# Patient Record
Sex: MALE | Race: WHITE | NOT HISPANIC OR LATINO | Employment: OTHER | URBAN - METROPOLITAN AREA
[De-identification: names, ages, dates, MRNs, and addresses within clinical notes are randomized per-mention and may not be internally consistent; named-entity substitution may affect disease eponyms.]

---

## 2020-04-22 ENCOUNTER — TELEMEDICINE (OUTPATIENT)
Dept: CARDIOLOGY CLINIC | Facility: CLINIC | Age: 70
End: 2020-04-22
Payer: MEDICARE

## 2020-04-22 DIAGNOSIS — R06.00 DYSPNEA, UNSPECIFIED TYPE: ICD-10-CM

## 2020-04-22 DIAGNOSIS — M79.2 NEURALGIA OF LEFT UPPER EXTREMITY: Primary | ICD-10-CM

## 2020-04-22 PROCEDURE — 99203 OFFICE O/P NEW LOW 30 MIN: CPT | Performed by: INTERNAL MEDICINE

## 2021-03-18 ENCOUNTER — TELEPHONE (OUTPATIENT)
Dept: NEUROLOGY | Facility: CLINIC | Age: 71
End: 2021-03-18

## 2021-03-18 NOTE — TELEPHONE ENCOUNTER
Best contact number for patient: [phone]    Referring provider and telephone number: Patient to call PCP to have order faxed    Primary Care Provider Name and if affiliated with [facility]: Dee Martinez    Reason for Appointment/Dx: Movement    Have you seen and followed up with a pediatric Neurologist for this disease in the past? No    Neurology Location patient would like to be seen: [facility] or  [place] [address]    Order received? No                                                Records Received?   No    Have you ever seen another Neurologist?      No    Insurance Information    Insurance Name: Medicare/Aetna    Notes: Mailed Paperwork and placed patient on waitlist     Appointment date: 09/08/21

## 2021-05-13 ENCOUNTER — CONSULT (OUTPATIENT)
Dept: NEUROLOGY | Facility: CLINIC | Age: 71
End: 2021-05-13
Payer: COMMERCIAL

## 2021-05-13 VITALS — WEIGHT: 195.6 LBS | DIASTOLIC BLOOD PRESSURE: 80 MMHG | HEART RATE: 52 BPM | SYSTOLIC BLOOD PRESSURE: 138 MMHG

## 2021-05-13 DIAGNOSIS — G20 PARKINSON DISEASE (HCC): Primary | ICD-10-CM

## 2021-05-13 DIAGNOSIS — F41.9 ANXIETY DISORDER, UNSPECIFIED TYPE: ICD-10-CM

## 2021-05-13 PROBLEM — G20.A1 PARKINSON DISEASE: Status: ACTIVE | Noted: 2021-05-13

## 2021-05-13 PROCEDURE — 99205 OFFICE O/P NEW HI 60 MIN: CPT | Performed by: PSYCHIATRY & NEUROLOGY

## 2021-05-13 RX ORDER — ESOMEPRAZOLE MAGNESIUM 40 MG/1
40 CAPSULE, DELAYED RELEASE ORAL DAILY
COMMUNITY
Start: 2021-04-26

## 2021-05-13 RX ORDER — ACETAMINOPHEN 325 MG/1
325 TABLET ORAL AS NEEDED
COMMUNITY

## 2021-05-13 RX ORDER — SERTRALINE HYDROCHLORIDE 25 MG/1
TABLET, FILM COATED ORAL
COMMUNITY
Start: 2021-04-18

## 2021-05-13 RX ORDER — CLONAZEPAM 0.5 MG/1
0.12 TABLET ORAL
COMMUNITY
Start: 2021-04-23

## 2021-05-13 NOTE — ASSESSMENT & PLAN NOTE
Mild intermittent right-sided rest tremors along with subtle right arm bradykinesia and decreased arm swing consistent with probable idiopathic Parkinson's disease  He is not on any medication therefore thus far has not proven responsiveness to dopamine supplementation  Given mild tremor and subtle symptoms on on exam it is reasonable to continue off medications at this point  He will contact us prior to next visit should he start to develop increase tremor or other symptoms and wished to start medication  Time spent discussing the diagnosis of Parkinson's disease, the pathology, and treatment  Various medication options were discussed  If he were to progress we will start with a trial of pramipexole at low-dose  Questions regarding supplements in the and their use in Parkinson's disease were answered  Questions regarding deep brain stimulation surgery were answered  We spoke about the importance of exercise in modifying the progression of Parkinson's disease  He was encouraged to continue exercising has well as he has been  We discussed diet and Parkinson's disease and the importance of keeping both socially, mentally, and physically active

## 2021-05-13 NOTE — ASSESSMENT & PLAN NOTE
Anxiety in the setting of Parkinson's disease  Much improved on Zoloft 50 mg daily  Questions regarding when to taper off Zoloft were answered  Given he has just started to note improvement in anxiety we will leave him on the Zoloft for now  In the future once stabilized we can consider slowly tapering off  He continues on clonazepam 0 125 mg every other day as he plans on tapering  This was initially started for anxiety but used also for sleep  He will take 1/4 tab night every other night for the next week and then discontinue

## 2021-05-13 NOTE — PROGRESS NOTES
Patient ID: Karime Campbell is a 72 y o  male  Assessment/Plan:    Anxiety disorder  Anxiety in the setting of Parkinson's disease  Much improved on Zoloft 50 mg daily  Questions regarding when to taper off Zoloft were answered  Given he has just started to note improvement in anxiety we will leave him on the Zoloft for now  In the future once stabilized we can consider slowly tapering off  He continues on clonazepam 0 125 mg every other day as he plans on tapering  This was initially started for anxiety but used also for sleep  He will take 1/4 tab night every other night for the next week and then discontinue  [facility]  Mild intermittent right-sided rest tremors along with subtle right arm bradykinesia and decreased arm swing consistent with probable idiopathic Parkinson's disease  He is not on any medication therefore thus far has not proven responsiveness to dopamine supplementation  Given mild tremor and subtle symptoms on on exam it is reasonable to continue off medications at this point  He will contact us prior to next visit should he start to develop increase tremor or other symptoms and wished to start medication  Time spent discussing the diagnosis of Parkinson's disease, the pathology, and treatment  Various medication options were discussed  If he were to progress we will start with a trial of pramipexole at low-dose  Questions regarding supplements in the and their use in Parkinson's disease were answered  Questions regarding deep brain stimulation surgery were answered  We spoke about the importance of exercise in modifying the progression of Parkinson's disease  He was encouraged to continue exercising has well as he has been  We discussed diet and Parkinson's disease and the importance of keeping both socially, mentally, and physically active         Diagnoses and all orders for this visit:    Parkinson disease ([facility] )    Anxiety disorder, unspecified type    Other orders  -     clonazePAM (KlonoPIN) 0 5 mg tablet; 0 125 mg Every other night  -     sertraline (ZOLOFT) 25 mg tablet; ONE TABLET ONCE A DAY FOR FOR 2 WEEKS, THEN 2 TABLETS ONCE A DAY  -     esomeprazole (NexIUM) 40 MG capsule; Take 40 mg by mouth daily  -     acetaminophen (Tylenol) 325 mg tablet; Take 325 mg by mouth as needed for mild pain       Spent 65 minutes on patient visit, counseling , review of outside MRI, labs and notes, and documentation  Subjective:    Mr Nimo Ambrosio is a 30-year-old man who presents for movement disorder evaluation for Parkinson's disease  He is here with his son Piper Guerrero  Symptoms began in January of 2021 with right arm and leg rest tremor  In retrospect he had developed increased difficulty to 6 with maintaining sleep back in August of 2020 with subsequent development of anxiety  Anxiety was treated with clonazepam 0 5 mg b i d  He was seen via [facility] by Neurology at [facility] Dr Keisha Andrea  There he was noted to have symptoms consistent with early Parkinson's disease  The potential treatment with carbidopa levodopa was discussed with the opted to remain off medications given subtle symptoms  He was started on Zoloft for anxiety as this was his main symptom that He saw a 2nd opinion with Dr Sarina Durán neurologist   He agreed with the diagnosis of Parkinson's disease and with holding off on the in the initiation of many medication  MRI of the brain was performed which showed mild white matter changes, with a small right and frontal and cerebellar lesion  Labs were unremarkable  He presents today for a 3rd opinion to establish care  Was driving tremor  Over the past 2 weeks his anxiety has started to improve  He began to lower his clonazepam slowly  He is currently taking clonazepam 0 5 1/4 tablet every other day  Tuesday was the 1st day he skipped a dose and he was able to sleep  Both sleep and anxiety have improved   Tremor is mild and intermittent on the right side  He joined [employer] steady which he attends 3 x weekly at [employer] in addition to lifting and walking 6-7 miles iron trial      Speech is unchanged  No difficulty chewing and swallowing  He has lost weight over the past year  No difficulty with dressing and hygiene acts  Handwriting is micrographic  He has no difficulty arising out of chair  Gait is improved with strategies use that [employer]  He has decreased arm swing which he is more aware if  No changes in cognition  He is able to perform household tasks without difficulty  He denies any hallucinations  There are no changes in olfaction  He has no symptoms of REM sleep behavior disorder or RLS  Objective:    Blood pressure 138/80, pulse (!) 52, weight 88 7 kg (195 lb 9 6 oz)  Physical Exam  Vitals signs reviewed  Eyes:      Extraocular Movements: Extraocular movements intact  Pupils: Pupils are equal, round, and reactive to light  Pulmonary:      Effort: Pulmonary effort is normal    Neurological:      Mental Status: He is alert  Deep Tendon Reflexes: Strength normal          Neurological Exam  Mental Status  Alert  Oriented to person, place, time and situation  Speech: hypophonia  Language is fluent with no aphasia  Attention and concentration are normal     Cranial Nerves  CN III, IV, VI: Extraocular movements intact bilaterally  Pupils equal round and reactive to light bilaterally  CN VII: Full and symmetric facial movement  CN VIII: Hearing is normal   CN IX, X: Palate elevates symmetrically  CN XI: Shoulder shrug strength is normal   CN XII: Tongue midline without atrophy or fasciculations  Motor   Increased muscle tone  Right Arm with activation  Strength is 5/5 throughout all four extremities  Sensory  Light touch is normal in upper and lower extremities  Coordination  Right: Finger-to-nose normal  Rapid alternating movement abnormality:  Left: Finger-to-nose abnormality: Mild   Rapid alternating movement normal   See MDS UPDRS III  Gait  Casual gait: Normal pull test  Able to rise from chair without using arms  Decreased left arm swing  Slight reduction in L stride        MDS UPDRS III                                 Time since last dose:    Speech  0   Facial Expression  1   Rigidity - Neck  2   Rigidity - Upper Extremity (Right)  1   Rigidity - Upper Extremity (Left)   0   Rigidity - Lower Extremity (Right)  1   Rigidity - Lower Extremity (Left)   0   Finger Taps (Right)   1   Finger Taps (Left)   0   Hand Movement (Right)  0   Hand Movement (Left)   0   Pronation/Supination (Right)  1   Pronation/Supination (Left)   0   Toe Tapping (Right) 1   Toe Tapping (Left) 0   Leg Agility (Right)  0   Leg Agility (Left)   0   Arising from Chair   0   Gait   1 decrease arm swing   Freezing of Gait 0   Postural Stability   0   Posture 1   Global spontaneity of movement 0   Postural Tremor (Right) 0   Postural Tremor (Left) 0   Kinetic Tremor (Right)  0   Kinetic Tremor (Left)  1   Rest tremor amplitude RUE 1   Rest tremor amplitude LUE 0   Rest tremor amplitude RLE 0   Reset tremor amplitude LLE 0   Lip/Jaw Tremor  0   Consistency of tremor 1   Motor Exam Total:            ROS:    Review of Systems   Constitutional: Positive for fatigue  Negative for appetite change and fever  HENT: Negative  Negative for hearing loss, tinnitus, trouble swallowing and voice change  Eyes: Negative  Negative for photophobia and pain  Respiratory: Negative  Negative for shortness of breath  Cardiovascular: Negative  Negative for palpitations  Gastrointestinal: Negative  Negative for nausea and vomiting  Endocrine: Negative  Negative for cold intolerance  Genitourinary: Negative  Negative for dysuria, frequency and urgency  Musculoskeletal: Negative for myalgias and neck pain  Getting strength back     Skin: Negative  Negative for rash  Allergic/Immunologic: Negative      Neurological: Positive for tremors (Right thumb and Big toe in right foot)  Negative for dizziness, seizures, syncope, facial asymmetry, speech difficulty, weakness, light-headedness, numbness and headaches  Hematological: Negative  Does not bruise/bleed easily  Psychiatric/Behavioral: Positive for sleep disturbance  Negative for confusion and hallucinations  All other systems reviewed and are negative  Review of system was personally reviewed

## 2021-08-25 ENCOUNTER — OFFICE VISIT (OUTPATIENT)
Dept: NEUROLOGY | Facility: CLINIC | Age: 71
End: 2021-08-25
Payer: COMMERCIAL

## 2021-08-25 VITALS
WEIGHT: 208.5 LBS | DIASTOLIC BLOOD PRESSURE: 65 MMHG | TEMPERATURE: 98.5 F | HEART RATE: 52 BPM | SYSTOLIC BLOOD PRESSURE: 128 MMHG

## 2021-08-25 DIAGNOSIS — G20 PARKINSON DISEASE (HCC): Primary | ICD-10-CM

## 2021-08-25 PROCEDURE — 99214 OFFICE O/P EST MOD 30 MIN: CPT | Performed by: PSYCHIATRY & NEUROLOGY

## 2021-08-25 NOTE — ASSESSMENT & PLAN NOTE
67-year-old man w h/o anxiety and parkinsons presents as a follow up for his parkinson's disease  His parkinson's symptoms are mostly stable at this time  He physically and socially active  He does have mild resting tremors in Rt hand/thumb, Rt leg mild rt cog wheel rigidity, Rt bradykinesia  His symptoms are still mild so we plan to not start any medication at this time  Since  He is doing well so he wanted to know if he should decrease his Zoloft and see how he does, its appropriate and pt would decrease Zoloft to half tab  Plan-  · Decrease Zoloft to 25 mg daily   Things that we know are helpful for thinking and memory   Exercise program: gradually increase your physical activity over time  Start small and be patient  Aerobic (cardio) activity is best but incorporate balance, strength and flexibility training as well  Try to get at least 30min 3 times per week   Diet: Mediterranean diet (colorful fruits and vegetables, olive oil, fish, whole grains, very little red meet is any), MIND diet, anti-inflammatory diet     Stay well hydrated: drink 6-8 glasses of water per day   What's good for your heart is good for your brain  Avoid high-salt foods   Sleep: aim for at least 7-8 hours per night   Avoid alcohol and medications like Benadryl (Tylenol PM) or other sedating drugs  Stress management/mindfulness practice:   Talk with our social workers about finding a cognitive behavioral therapists  Try a smart phone toya like [employer] or mindfulness for beginners   Try curable for pain    Take a course in Mindfulness Based Stress Reduction (MBSR)   David crowley  Read or listen to an audiobook about it:  Mindfulness for beginners   10% happier  The happiness advantage   Social engagement:   Stay in touch with family and friends   Plan a few specific activities for your social health every week   Join a local support group   Volunteer! www [email or web address] or call [phone] diet score:  1 point for each component      Green leafy vegetables: at least 6 per week  Other vegetable: at least 1 per day   Berries: at least 2 per week  Red meat: fewer than 4 per week   Fish: at least 1 per week   Poultry: at least 2 per week  Beans: at least 3 per week  Nuts: at least 5 per week  Fast or fried food: less than 1 per week  Olive oil   Butter less: less than 1 table-spoon per day   Cheese: less than 1 serving per week   Pastries/sweets: less than 5 servings per week  Alcohol: no more than 1 serving/ day

## 2021-08-25 NOTE — PROGRESS NOTES
Patient ID: Rolly Lo is a 72 y o  male  Assessment/Plan:    Parkinson disease [facility])  67-year-old man w h/o anxiety and parkinsons presents as a follow up for his parkinson's disease  His parkinson's symptoms are mostly stable at this time  He physically and socially active  He does have mild resting tremors in Rt hand/thumb, Rt leg mild rt cog wheel rigidity, Rt bradykinesia  His symptoms are still mild so we plan to not start any medication at this time  Since  He is doing well so he wanted to know if he should decrease his Zoloft and see how he does, its appropriate and pt would decrease Zoloft to half tab  Plan-  · Decrease Zoloft to 25 mg daily   Things that we know are helpful for thinking and memory   Exercise program: gradually increase your physical activity over time  Start small and be patient  Aerobic (cardio) activity is best but incorporate balance, strength and flexibility training as well  Try to get at least 30min 3 times per week   Diet: Mediterranean diet (colorful fruits and vegetables, olive oil, fish, whole grains, very little red meet is any), MIND diet, anti-inflammatory diet     Stay well hydrated: drink 6-8 glasses of water per day   What's good for your heart is good for your brain  Avoid high-salt foods   Sleep: aim for at least 7-8 hours per night   Avoid alcohol and medications like Benadryl (Tylenol PM) or other sedating drugs  Stress management/mindfulness practice:   Talk with our social workers about finding a cognitive behavioral therapists  Try a smart phone toya like [employer] or mindfulness for beginners   Try curable for pain    Take a course in Mindfulness Based Stress Reduction (MBSR)   David crowley  Read or listen to an audiobook about it:  Mindfulness for beginners   10% happier  The happiness advantage   Social engagement:   Stay in touch with family and friends   Plan a few specific activities for your social health every week Join a local support group   Volunteer! www [email or web address] or call [phone]    MIND diet score:  1 point for each component  Green leafy vegetables: at least 6 per week  Other vegetable: at least 1 per day   Berries: at least 2 per week  Red meat: fewer than 4 per week   Fish: at least 1 per week   Poultry: at least 2 per week  Beans: at least 3 per week  Nuts: at least 5 per week  Fast or fried food: less than 1 per week  Olive oil   Butter less: less than 1 table-spoon per day   Cheese: less than 1 serving per week   Pastries/sweets: less than 5 servings per week  Alcohol: no more than 1 serving/ day       Diagnoses and all orders for this visit:    Parkinson disease ([facility] )           Subjective:    44-year-old man w h/o anxiety and parkinsons presents as a follow up for his parkinson's disease      He is here with his wife- Bernarda Teixeira  Symptoms began in January of 2021 with right arm and leg rest tremor  In retrospect he had developed increased difficulty to 6 with maintaining sleep back in August of 2020 with subsequent development of anxiety  Anxiety was treated with clonazepam 0 5 mg b i d  Since his last visist, He thinks things are stable, he continues to have tremors but only his right thumb sometimes in rt big toe, it does stop at times, he stopped clonazepam    He walks 16-18 miles a week  He lifts weight  He goes for rock steady boxing three times a week, tries his best to remain active  Regarding motor symptoms:   Tremor: Yes, now only in Rt thumb and sometimes in Rt big toe  Slowness- Stable   Stiffness: Yes, flexibility has got better as he stretches now  Dystonia: No   Changes in facial expression: okay   Changes in voice: It is softer     Changes in writing: yes, smaller   Changes in gait: He did have shuffling, No   Falls: No   Freezing: No   Trouble with swallowing: No   Clumsiness/dexterity: No      Regarding non-motor symptoms:   Anosmia: No   Constipation: yes,  Urinary sx: wake up once in a night   Lightheadedness: No   Changes in Mood: No   Trouble with sleep: No difficulty falling sleep,      REM behavior Disorder: No   Memory trouble: No   Hallucinations: No      The following portions of the patient's history were reviewed and updated as appropriate: allergies, current medications, past family history, past medical history, past social history, past surgical history and problem list          Objective:    Blood pressure 128/65, pulse (!) 52, temperature 98 5 °F (36 9 °C), weight 94 6 kg (208 lb 8 oz)  Physical Exam  Vitals reviewed  Constitutional:       Appearance: Normal appearance  HENT:      Head: Normocephalic  Mouth/Throat:      Mouth: Mucous membranes are moist       Pharynx: Oropharynx is clear  Eyes:      Extraocular Movements: Extraocular movements intact  Pupils: Pupils are equal, round, and reactive to light  Cardiovascular:      Rate and Rhythm: Normal rate  Pulses: Normal pulses  Pulmonary:      Effort: Pulmonary effort is normal    Abdominal:      Palpations: Abdomen is soft  Musculoskeletal:         General: Normal range of motion  Cervical back: Normal range of motion  Skin:     General: Skin is warm  Capillary Refill: Capillary refill takes less than 2 seconds  Neurological:      Mental Status: He is alert  Psychiatric:         Mood and Affect: Mood normal          Neurological Examination:     Mental Status: The patient was awake, alert, attentive, oriented to person, place, and time  Recent and remote memory intact to conversation with no evidence of language dysfunction  Cranial Nerves:   I: smell Not tested   II: visual fields Full to confrontation  Pupils equal, round, reactive to light with normal accomodation  Fundus: benign fundus  III,IV,VI: extraocular muscles EOMI, no nystagmus   V: masseter and pterygoid strength full   Sensation in the V1 through V3 distributions intact to pinprick and light touch bilaterally  VII: Face is symmetric with no weakness noted  VIII: Audition intact to finger rub bilaterally  IX/X: Uvula midline  Soft palate elevation symmetric  XI: Trapezius and SCM strength 5/5 B/L  XII: Tongue midline with no atrophy or fasciculations with appropriate movement  Motor Examination:   No pronator drift  Bulk: Normal  No atrophy Tone: Mild cogwheel rigidity in Rt arm  Fasciculations: None  Mild resting tremors in Rt hand, tremors still there in Rt when he extends his arms  Mild tremor in Rt leg, mild bradykinesia in Rt > left    Subtle mask facies- decrease eye blinking  Deltoid Biceps Triceps WE   WF   FF IO     Right        5         5          5         5      5      5   5        Left           5        5          5          5      5     5   5                       IP        Quad   Ham     TA       Gastroc   Right      5            5          5         5                5  Left         5            5         5         5                5       Reflexes:     2+ everywhere   Clonus: None    Coordination: Patient able to perform normal finger-to-nose and heel to shin appropriately  Normal rapid alternating movements  Sensory: Normal sensation to light touch throughout     Gait:normal stance and posture, normal stride length and arm swing,  Romberg negative  ROS:    Review of Systems   Constitutional: Negative  Negative for appetite change and fever  HENT: Negative  Negative for hearing loss, tinnitus, trouble swallowing and voice change  Eyes: Negative  Negative for photophobia and pain  Respiratory: Negative  Negative for shortness of breath  Cardiovascular: Negative  Negative for palpitations  Gastrointestinal: Negative  Negative for nausea and vomiting  Endocrine: Negative  Negative for cold intolerance  Genitourinary: Negative  Negative for dysuria, frequency and urgency  Musculoskeletal: Negative for myalgias and neck pain     Skin: Negative  Negative for rash  Allergic/Immunologic: Negative  Neurological: Positive for tremors (Right hand and only when resting but when sleeping it does not shake at all  has gotten better since last visit)  Negative for dizziness, seizures, syncope, facial asymmetry, speech difficulty, weakness, light-headedness, numbness and headaches  Hematological: Negative  Does not bruise/bleed easily  Psychiatric/Behavioral: Negative  Negative for confusion, hallucinations and sleep disturbance  All other systems reviewed and are negative

## 2021-08-25 NOTE — PATIENT INSTRUCTIONS
· Decrease Zoloft to 25 mg daily   Things that we know are helpful for thinking and memory   Exercise program: gradually increase your physical activity over time  Start small and be patient  Aerobic (cardio) activity is best but incorporate balance, strength and flexibility training as well  Try to get at least 30min 3 times per week   Diet: Mediterranean diet (colorful fruits and vegetables, olive oil, fish, whole grains, very little red meet is any), MIND diet, anti-inflammatory diet  Stay well hydrated: drink 6-8 glasses of water per day   What's good for your heart is good for your brain  Avoid high-salt foods   Sleep: aim for at least 7-8 hours per night   Avoid alcohol and medications like Benadryl (Tylenol PM) or other sedating drugs  Stress management/mindfulness practice:   Talk with our social workers about finding a cognitive behavioral therapists  Try a smart phone toya like [employer] or mindfulness for beginners   Try curable for pain    Take a course in Mindfulness Based Stress Reduction (MBSR)   David crowley  Read or listen to an audiobook about it:  Mindfulness for beginners   10% happier  The happiness advantage   Social engagement:   Stay in touch with family and friends   Plan a few specific activities for your social health every week   Join a local support group   Volunteer! www [email or web address] or call [phone]    MIND diet score:  1 point for each component      Green leafy vegetables: at least 6 per week  Other vegetable: at least 1 per day   Berries: at least 2 per week  Red meat: fewer than 4 per week   Fish: at least 1 per week   Poultry: at least 2 per week  Beans: at least 3 per week  Nuts: at least 5 per week  Fast or fried food: less than 1 per week  Olive oil   Butter less: less than 1 table-spoon per day   Cheese: less than 1 serving per week   Pastries/sweets: less than 5 servings per week  Alcohol: no more than 1 serving/ day

## 2021-09-08 ENCOUNTER — TELEPHONE (OUTPATIENT)
Dept: NEUROLOGY | Facility: CLINIC | Age: 71
End: 2021-09-08

## 2021-09-08 NOTE — TELEPHONE ENCOUNTER
Pt called the office  He was last seen 8/25  He has decided he would like to start medication for his PD  He states his tremor in right hand seems more constant at rest      Please advise      Pt# [phone]

## 2021-09-09 NOTE — TELEPHONE ENCOUNTER
I believe we previously spoke about starting a small dose of a dopamine agonist  We could start pramipexole ER 0 375mg qhs  If not covered and needs pramipexole IR then would start with pramipexole 0 125mg 1 tab 3 times daily q 8 hours apart with plans to increase to 0 25mg 3 x daily after 7 days     Script sent  Potential side effects: lightheaedness, nausea, leg edema , hallucinations, impulsivity

## 2021-09-09 NOTE — TELEPHONE ENCOUNTER
Reviewed Dr Kaylin Franz response with patient  Informed him that the rx for the ER was sent to pharmacy however he may need to start the IR if insurance requires it  Advised patient to f/u with pharmacy and let us know if they told him it's not covered

## 2021-09-15 NOTE — TELEPHONE ENCOUNTER
I would give it 2 weeks to notice any difference and see if the tiredness / sluggishness subside  He is on low dose so if not effective but tolerable we could increase a bit more     Yes  yes

## 2021-09-15 NOTE — TELEPHONE ENCOUNTER
Patient stated he has a few questions for Dr Melany An:    1  How long should it take for him to notice the pramipexole working  He reports that he's been taking it for the past 3 nights and hasn't noticed any benefits  Denies any SE except feeling more tired and sluggish  2  Is he okay to get the flu vaccine while taking the pramipexole? 3  Is he okay to get the COVID booster shot while taking the pramipexole? (he's already had the 2 shots)  Please advise  Okay to leave vm

## 2021-10-04 ENCOUNTER — TELEPHONE (OUTPATIENT)
Dept: NEUROLOGY | Facility: CLINIC | Age: 71
End: 2021-10-04

## 2021-12-08 ENCOUNTER — OFFICE VISIT (OUTPATIENT)
Dept: NEUROLOGY | Facility: CLINIC | Age: 71
End: 2021-12-08
Payer: COMMERCIAL

## 2021-12-08 VITALS — SYSTOLIC BLOOD PRESSURE: 136 MMHG | WEIGHT: 216 LBS | DIASTOLIC BLOOD PRESSURE: 72 MMHG | HEART RATE: 67 BPM

## 2021-12-08 DIAGNOSIS — G20 PARKINSON DISEASE (HCC): Primary | ICD-10-CM

## 2021-12-08 PROCEDURE — 99214 OFFICE O/P EST MOD 30 MIN: CPT | Performed by: PSYCHIATRY & NEUROLOGY

## 2021-12-08 RX ORDER — PRAMIPEXOLE 1.5 MG/1
1 TABLET, EXTENDED RELEASE ORAL
Qty: 30 TABLET | Refills: 4 | Status: SHIPPED | OUTPATIENT
Start: 2021-12-08 | End: 2022-01-10 | Stop reason: DRUGHIGH

## 2022-01-10 ENCOUNTER — TELEPHONE (OUTPATIENT)
Dept: NEUROLOGY | Facility: CLINIC | Age: 72
End: 2022-01-10

## 2022-01-10 NOTE — TELEPHONE ENCOUNTER
HE is to reduced pramipexole ER back to 1 mg qhs and we will add Sinemet 25/100 1/2 tab 3 times daily to be taken about 5-6 hours apart on an empty stomach if possible, if not with a low protein snack Script will be sent to pharmacy    Routing comment

## 2022-01-10 NOTE — TELEPHONE ENCOUNTER
We will reduce pramipexole ER to 0 75mg qhs  Add Sinemet 25/100 1 /2 tab 3 times daily taken q 5-6 hours apart on empty stomach  If ineffective in controlling symptoms after 1-2 week increase to 1 tab 3 times daily, contact us if he develops side effects   Script sent to pharmacy

## 2022-01-10 NOTE — TELEPHONE ENCOUNTER
pt called and states and state that he is taking   pramiperole er 1 5mg since dec 8   states that he is starting to experience some side effects that are concerning him  slight lightheadedness at times, signifcant weight gain (25 lbs), mild constipation, dry mouth, always has a mild headache, mild chest pain, left side, almost like a gas pain and mild trouble breathing  chest pain and trouble breathing occured 3-4 times in last month  states that he only slept 2 hours last night, spasms when he is sitting,   tremor in r hand and thumb is still there, not getting any worse but not getting any better  tremor is always there when he is inactive  chest pain and trouble breathing lasted all night last night  No chest pain or trouble breathing now  States that while he was being diagnosed with PD he lost 25lb and he has now gained that weight back  This is what he means by saying he gained 25 lbs     He is agreeable to changing medications if that is what is recommended  Please advise  [phone] to leave detailed message

## 2022-03-31 ENCOUNTER — TELEPHONE (OUTPATIENT)
Dept: NEUROLOGY | Facility: CLINIC | Age: 72
End: 2022-03-31

## 2022-03-31 NOTE — TELEPHONE ENCOUNTER
Spoke with pt and he verbalizes clear understanding  Pt is agreeable to decreasing dose  Pt states that he recently filled his Mirapex 0 75 mg script and is wondering if he may cut pills in half for new dose  Dr Jose Valdovinos - Please advise

## 2022-03-31 NOTE — TELEPHONE ENCOUNTER
Pt calling to report that he takes pramipexole at HS but he is wondering what is it exactly doing for him  States that his over all health is good but he gained 25 lbs in last few months  He states that he has been experiencing excessive eating and he's noticed slight swelling in ankle at times, not too bad, he notices it when he takes his socks off  Pt also says he feels bloated  Pt asking if he should stay on Mirapex or can he get off of it, but doesn't want being off of it to effect overall health and sleep  Pt says he works out everyday, walks 4-8 miles, and is still gaining weight  Pt takes Sinemet 1 tab TID (7am, noon, and 5pm) and Mirapex ER 0 75 mg 1 tablet at HS  Pt states that he can wait until appt to discuss but is wondering if these symptoms are from the Mirapex  Best cb [phone], ok to leave detailed message  Dr Melissa Piña - Please advise

## 2022-03-31 NOTE — TELEPHONE ENCOUNTER
Yes Mirapex can cause edema, weight gain , and impulsivity in some where they may overeat  If this is occurring we can lower and adjust the Sinemet as we need to  Will send in a script for Mirapex ER 0 375mg qhs, if no improvement after 3-4 weeks he can discontinue   We have to consider side effect versus benefit  Mirapex may be extending the levodopa so he may start to notice his levodopa doses are not lasting as long or are not as effective

## 2022-03-31 NOTE — TELEPHONE ENCOUNTER
Pt made aware not to cut medication in half and states he will  script tomorrow and take first dose tomorrow night

## 2022-05-26 ENCOUNTER — OFFICE VISIT (OUTPATIENT)
Dept: NEUROLOGY | Facility: CLINIC | Age: 72
End: 2022-05-26
Payer: COMMERCIAL

## 2022-05-26 VITALS — SYSTOLIC BLOOD PRESSURE: 136 MMHG | HEART RATE: 49 BPM | DIASTOLIC BLOOD PRESSURE: 67 MMHG

## 2022-05-26 DIAGNOSIS — F41.9 ANXIETY DISORDER, UNSPECIFIED TYPE: ICD-10-CM

## 2022-05-26 DIAGNOSIS — G20 PARKINSON DISEASE (HCC): Primary | ICD-10-CM

## 2022-05-26 PROCEDURE — 99214 OFFICE O/P EST MOD 30 MIN: CPT | Performed by: PSYCHIATRY & NEUROLOGY

## 2022-05-26 NOTE — ASSESSMENT & PLAN NOTE
Tremor predominant PD with slow progression over the past year  Good response to medications with regards to bradykinesia and rigidity but tremor not controlled  Time spent discussing PD, its progression and treatment options  Increases in Sinemet may better control tremor but all other symptoms are well controlled so after discussion we opted not to do so  Will have him remain on his current medication regimen  Tremor does not interfere with function but rather is just more conscious of them   Future surgical options discussed  He is considering using THC/CBD in an attempt to control tremor which is reasonable  Already on OTC CBD for sleep  Questions regarding medical marijuana were answered  There are no large, multicenter, placebo controlled trials with this and PD  Small ancedotal studies and surveys report some benefit in tremor, dyskinesia and nonmotor symptoms such as sleep and anxiety  He was encouraged to continue to remain active  Questions regarding waking and diet were answered  We discussed the potential benefits of a Mediterranean or MIND diet

## 2022-05-26 NOTE — PROGRESS NOTES
Patient ID: Darryle Khat is a 80 y o  male    Assessment/Plan:    Parkinson disease ([facility] [address] )  Tremor predominant PD with slow progression over the past year  Good response to medications with regards to bradykinesia and rigidity but tremor not controlled  Time spent discussing PD, its progression and treatment options  Increases in Sinemet may better control tremor but all other symptoms are well controlled so after discussion we opted not to do so  Will have him remain on his current medication regimen  Tremor does not interfere with function but rather is just more conscious of them   Future surgical options discussed  He is considering using THC/CBD in an attempt to control tremor which is reasonable  Already on OTC CBD for sleep  Questions regarding medical marijuana were answered  There are no large, multicenter, placebo controlled trials with this and PD  Small ancedotal studies and surveys report some benefit in tremor, dyskinesia and nonmotor symptoms such as sleep and anxiety  He was encouraged to continue to remain active  Questions regarding waking and diet were answered  We discussed the potential benefits of a Mediterranean or MIND diet  Diagnoses and all orders for this visit:    Parkinson disease [facility])        Subjective:      Mr Adama Bell is a 66ear-old man who presents for follow up for Parkinson's disease  To review, symptoms began in January 2021 with right arm and leg rest tremor  In retrospect insomnia with subsequent anxiety presents since Aug 2020  Dx by Neurology at [facility] Dr Maryuri Sterling  Started on Zoloft for anxiety  Seen by me for 3rd neurological opinion 5/2021  Opted not to treat until 9/2021       Prior medication trials:  Pramipexole Er 0 75mg qhs- compulsive eating and weight gain, lightheadedness, mild HA    Current medications:  Sinemet 25/100 1 tab 3 times daily   pramipexole Er 0 375mg qhs  CBD calm 10mg gummies - helps with anxiety/ calming    Overall doing well on current regimen  He continues to have right hand rest tremor  This is bothersome  He has looked into [facility] for PD tremor  Speech is unchanged,  No difficulty chewing and swallowing  Sleeps 6-7 hours due to multiple awakenings  Cognition is unchanged  He remains very active attending PD Boxing 3 times weekly at [facility]  He walks and lifts weights  He has been staying Flexibility has improved  Physically feels better  He has gained weight but this has stabilized  He plans to see a nutritionist       Objective:    /67 (BP Location: Left arm, Patient Position: Sitting, Cuff Size: Standard)   Pulse (!) 49       Physical Exam  Eyes:      Extraocular Movements: Extraocular movements intact  Pupils: Pupils are equal, round, and reactive to light  Neurological:      Mental Status: He is alert  Deep Tendon Reflexes: Strength normal    Psychiatric:         Speech: Speech normal          Neurological Exam  Mental Status  Alert  Oriented to person, place, time and situation  Speech is normal  Language is fluent with no aphasia  Attention and concentration are normal     Cranial Nerves  CN III, IV, VI: Extraocular movements intact bilaterally  Pupils equal round and reactive to light bilaterally  CN V: Facial sensation is normal   CN VII: Full and symmetric facial movement  CN IX, X: Palate elevates symmetrically  CN XI: Shoulder shrug strength is normal   CN XII: Tongue midline without atrophy or fasciculations  Motor   Normal muscle tone  Strength is 5/5 throughout all four extremities  Sensory  Light touch is normal in upper and lower extremities  Coordination  Right: Finger-to-nose normal  Rapid alternating movement abnormality:Left: Finger-to-nose normal  Rapid alternating movement normal   See MDS UPDRS III    Mild decrement on right CLARA  Moderate RH rest tremor  Gait  Casual gait: Abnormal pull test  Retropulsion, recovered in 4 steps  Chon Elizabeth Able to rise from chair without using arms  MDS UPDRS III                                       Time since last dose:    Speech  0   Facial Expression  1   Rigidity - Neck  0   Rigidity - Upper Extremity (R)  0   Rigidity - Upper Extremity (L)   0   Rigidity - Lower Extremity (R)  0   Rigidity - Lower Extremity (L)   0   Finger Taps (R)   0   Finger Taps (L)   0   Hand Movement (R)  0   Hand Movement (L)   0   Pronation/Supination (R)  2   Pronation/Supination (L)   0   Toe Tapping (R) 1   Toe Tapping (L) 1   Leg Agility (R)  0   Leg Agility (L)   0   Arising from Chair   0   Gait   1 decr arm swing   Freezing of Gait 0   Postural Stability   1   Posture 2   Global spontaneity of movement 0   Postural Tremor (Ri 0   Postural Tremor (L) 0   Kinetic Tremor (R)  0   Kinetic Tremor (L)  0   Rest tremor amplitude RUE 0   Rest tremor amplitude LUE 2   Rest tremor amplitude RLE 0   Reset tremor amplitude LLE 0   Lip/Jaw Tremor  0   Consistency of tremor 2   Motor Exam Total:             Current Outpatient Medications on File Prior to Visit   Medication Sig Dispense Refill    acetaminophen (TYLENOL) 325 mg tablet Take 325 mg by mouth as needed for mild pain      carbidopa-levodopa (Sinemet)  mg per tablet 1/2 tab 3 times daily (q 5-6 hours apart from awakening on empty stomach)  If not improvement after 2 weeks can increase to 1 tab 3 times daily 90 tablet 5    Pramipexole Dihydrochloride ER 0 375 MG TB24 Take 1 tablet (0 375 mg total) by mouth in the morning 30 tablet 2    clonazePAM (KlonoPIN) 0 5 mg tablet 0 125 mg Every other night (Patient not taking: No sig reported)      esomeprazole (NexIUM) 40 MG capsule Take 40 mg by mouth daily (Patient not taking: No sig reported)      sertraline (ZOLOFT) 25 mg tablet ONE TABLET ONCE A DAY FOR FOR 2 WEEKS, THEN 2 TABLETS ONCE A DAY (Patient not taking: No sig reported)       No current facility-administered medications on file prior to visit           Erasmo Vieira MD Afia  Movement disorder physician  [address] [place] [address]

## 2022-06-09 DIAGNOSIS — G20 PARKINSON DISEASE: ICD-10-CM

## 2022-06-09 RX ORDER — PRAMIPEXOLE 0.38 MG/1
1 TABLET, EXTENDED RELEASE ORAL DAILY
Qty: 90 TABLET | Refills: 1 | Status: SHIPPED | OUTPATIENT
Start: 2022-06-09 | End: 2022-11-17 | Stop reason: SDUPTHER

## 2022-09-07 ENCOUNTER — TELEPHONE (OUTPATIENT)
Dept: NEUROLOGY | Facility: CLINIC | Age: 72
End: 2022-09-07

## 2022-09-07 NOTE — TELEPHONE ENCOUNTER
Pt called and left message,reported increase in tremors in R thumb that are bothersome  asking what else can be done  Called pt to get some more information, reported increased in R thumb tremors over the last week or so  Parkinson's disease: Freezing? No    Shuffling? No    Difficulty Walking? No    Recent Falls? No    Any extra movements? Yes - R thumb tremors     Any Hallucinations? No  What time of day are symptoms worse- no specific time,ussually when resting   Current medications:  Carbidopa-levodopa  1 tab po TID  Pramipexole ER 0 375 po at HS,    Ask how long after each dose do they feel that it "wears off"? - denies     Does patient have a DBS? No     Pt denies any new  medications,denies any recent illness, reported seasonal allergy and appears to be worse lately and start taking Claritin    Best CB #[phone],YK to leave a message    Please advise next OV- 11/17

## 2022-09-08 NOTE — TELEPHONE ENCOUNTER
Called pt back  And advised regarding below, he verbalized understanding and agreed with dr Estefani Hess recommendation

## 2022-09-08 NOTE — TELEPHONE ENCOUNTER
Could try increasing Sinemet to 1 5 3 times daily Alert-The patient is alert, awake and responds to voice. The patient is oriented to time, place, and person. The triage nurse is able to obtain subjective information.

## 2022-11-17 ENCOUNTER — OFFICE VISIT (OUTPATIENT)
Dept: NEUROLOGY | Facility: CLINIC | Age: 72
End: 2022-11-17

## 2022-11-17 VITALS — SYSTOLIC BLOOD PRESSURE: 138 MMHG | WEIGHT: 215 LBS | HEART RATE: 56 BPM | DIASTOLIC BLOOD PRESSURE: 72 MMHG

## 2022-11-17 DIAGNOSIS — G20 PARKINSON DISEASE (HCC): Primary | ICD-10-CM

## 2022-11-17 DIAGNOSIS — F41.9 ANXIETY DISORDER, UNSPECIFIED TYPE: ICD-10-CM

## 2022-11-17 RX ORDER — PRAMIPEXOLE 0.38 MG/1
1 TABLET, EXTENDED RELEASE ORAL DAILY
Qty: 90 TABLET | Refills: 2 | Status: SHIPPED | OUTPATIENT
Start: 2022-11-17 | End: 2022-11-17

## 2022-11-17 NOTE — PROGRESS NOTES
Review of Systems   Constitutional: Negative  Negative for appetite change and fever  HENT: Negative  Negative for hearing loss, tinnitus, trouble swallowing and voice change  Eyes: Negative  Negative for photophobia, pain and visual disturbance  Respiratory: Negative  Negative for shortness of breath  Cardiovascular: Negative  Negative for palpitations  Gastrointestinal: Negative  Negative for nausea and vomiting  Endocrine: Negative  Negative for cold intolerance  Genitourinary: Negative  Negative for dysuria, frequency and urgency  Musculoskeletal: Negative  Negative for gait problem, myalgias and neck pain  Skin: Negative  Negative for rash  Allergic/Immunologic: Negative  Neurological: Positive for tremors (Right thumb and has stayed the same but is only in his thumb of Right hand, no longer in arm or leg)  Negative for dizziness, seizures, syncope, facial asymmetry, speech difficulty, weakness, light-headedness, numbness and headaches  Hematological: Negative  Does not bruise/bleed easily  Psychiatric/Behavioral: Positive for sleep disturbance (last couple weeks has been having issues sleeping past 3:30 AM)  Negative for confusion and hallucinations  All other systems reviewed and are negative

## 2022-11-17 NOTE — ASSESSMENT & PLAN NOTE
Tremor predominant PD, H&Y stage I, with slow progression over the past year  Mild progression in bradykinesia her hands since last seen  He continues to have tremors which are not fully controlled on low-dose levodopa  No clear fluctuations  He is satisfied response medications  He did however have several questions with regards to prognosis, and future treatment options  Time spent discussing PD, its progression and treatment options  We discussed the potential benefits of increasing Sinemet to 1 tablet 4 times daily given longer days in his concern with regards to having medications overnight  Increased levodopa may help better control tremor  Would limit any increase in pramipexole given prior side effects weight gain  Questions with regards to other medications PD were discussed  We discussed potential options including addition of COMT inhibitors, amantadine, Nourianz, and rescue medications such as able morphine  He will try [facility] as planned to determine if this may help sleep and tremor  In the future we may considery increasing levodopa to 1 tab 4 time sdaily (q 5 hours apart)  He was encouraged to continue to remain active

## 2022-11-17 NOTE — PATIENT INSTRUCTIONS
Try medical marijuana as planned to determine if this may help sleep and tremor  Future option will be to try increasing levodopa to 1 tab 4 time sdaily (q 5 hours apart)

## 2022-11-17 NOTE — PROGRESS NOTES
Patient ID: Flori Barr is a 71 y o  male    Assessment/Plan:    Anxiety disorder  Mood fairly well controlled  Parkinson disease ([facility] )  Tremor predominant PD, H&Y stage I, with slow progression over the past year  Mild progression in bradykinesia her hands since last seen  He continues to have tremors which are not fully controlled on low-dose levodopa  No clear fluctuations  He is satisfied response medications  He did however have several questions with regards to prognosis, and future treatment options  Time spent discussing PD, its progression and treatment options  We discussed the potential benefits of increasing Sinemet to 1 tablet 4 times daily given longer days in his concern with regards to having medications overnight  Increased levodopa may help better control tremor  Would limit any increase in pramipexole given prior side effects weight gain  Questions with regards to other medications PD were discussed  We discussed potential options including addition of COMT inhibitors, amantadine, Nourianz, and rescue medications such as able morphine  He will try [facility] as planned to determine if this may help sleep and tremor  In the future we may considery increasing levodopa to 1 tab 4 time sdaily (q 5 hours apart)  He was encouraged to continue to remain active  Diagnoses and all orders for this visit:    Parkinson disease ([facility] )  -     carbidopa-levodopa (SINEMET)  mg per tablet; take 1 TAB 3 TIMES DAILY  -     Discontinue: Pramipexole Dihydrochloride ER 0 375 MG TB24; Take 1 tablet (0 375 mg total) by mouth in the morning Night time    Anxiety disorder, unspecified type    Spent over 40 minutes on patient visit, counseling, and documentation  Subjective:      Mr Braxton Castleman is a 66ear-old man who presents for follow up for Parkinson's disease  To review, symptoms began in January 2021 with right arm and leg rest tremor   In retrospect insomnia with subsequent anxiety presents since Aug 2020  Dx by Neurology at [facility] Dr Wendy Romero  Started on Zoloft for anxiety  Seen by me for 3rd neurological opinion 5/2021  Opted not to treat until 9/2021  Prior medication trials:  Pramipexole Er 0 75mg qhs- compulsive eating and weight gain, lightheadedness, mild HA    Current medications:  Sinemet 25/100 1 tab 3 times daily   pramipexole Er 0 375mg q9:30pm  CBD calm 10mg gummies - helps with anxiety/ calming    He has noted a tightnessness in the face upon first arising lasting about 5-10 minutes  He has difficulty sleeping past 3:30am  He tends to fall asleep on the cough and watches to tv fall asleep  No worsening of tremor  He is interested in trying [facility] to see if this may help tremor in sleep  Medications are lasting all day without wearing off  He denies any changes in cognition  He remains very active attending PD Boxing 3 times weekly at [facility]  He walks and lifts weights  Objective:    /72 (BP Location: Left arm, Patient Position: Standing, Cuff Size: Standard)   Pulse 56   Wt 97 5 kg (215 lb)       Physical Exam  Vitals reviewed  Eyes:      Extraocular Movements: Extraocular movements intact  Pupils: Pupils are equal, round, and reactive to light  Neurological:      Mental Status: He is alert  Motor: Motor strength is normal          Neurological Exam  Mental Status  Alert  Oriented to person, place, time and situation  Speech: hypophonia  Language is fluent with no aphasia  Attention and concentration are normal     Cranial Nerves  CN III, IV, VI: Extraocular movements intact bilaterally  Pupils equal round and reactive to light bilaterally  CN VII: Full and symmetric facial movement  CN VIII: Hearing is normal   CN IX, X: Palate elevates symmetrically  CN XI: Shoulder shrug strength is normal   CN XII: Tongue midline without atrophy or fasciculations  Motor   Normal muscle tone   Strength is 5/5 throughout all four extremities  Sensory  Light touch is normal in upper and lower extremities  Coordination  Right: Finger-to-nose normal  Rapid alternating movement abnormality:Left: Finger-to-nose normal  Rapid alternating movement normal   See motor UPDRS    Intermittent moderate right hand rest tremor  Mild decrement on finger taps and rapid alternating movements on the right       Gait  Casual gait: Able to rise from chair without using arms    Mild reduction in right arm swing, rest tremor        MDS UPDRS III                                     11/17/22   Time since last dose:      Speech  0 0   Facial Expression  1 1   Rigidity - Neck  0 0   Rigidity - Upper Extremity (R)  0 0   Rigidity - Upper Extremity (L)   0 0   Rigidity - Lower Extremity (R)  0 0   Rigidity - Lower Extremity (L)   0 0   Finger Taps (R)   0 1>   Finger Taps (L)   0 1   Hand Movement (R)  0 1   Hand Movement (L)   0 0   Pronation/Supination (R)  2 2   Pronation/Supination (L)   0 0   Toe Tapping (R) 1 1   Toe Tapping (L) 1 1   Leg Agility (R)  0 0   Leg Agility (L)   0 0   Arising from Chair   0 0   Gait   1 decr arm swing 1   Freezing of Gait 0 0   Postural Stability   1    Posture 2 1   Global spontaneity of movement 0 0   Postural Tremor (Ri 0 0   Postural Tremor (L) 0 0   Kinetic Tremor (R)  0 0   Kinetic Tremor (L)  0 0   Rest tremor amplitude RUE 0 2   Rest tremor amplitude LUE 2 0   Rest tremor amplitude RLE 0 0   Reset tremor amplitude LLE 0 0   Lip/Jaw Tremor  0 0   Consistency of tremor 2 2   Motor Exam Total:             Current Outpatient Medications on File Prior to Visit   Medication Sig Dispense Refill   • acetaminophen (TYLENOL) 325 mg tablet Take 325 mg by mouth as needed for mild pain     • esomeprazole (NexIUM) 40 MG capsule Take 40 mg by mouth daily     • [DISCONTINUED] carbidopa-levodopa (SINEMET)  mg per tablet take 1 TAB 3 TIMES DAILY 270 tablet 1   • [DISCONTINUED] Pramipexole Dihydrochloride ER 0 375 MG TB24 Take 1 tablet (0 375 mg total) by mouth in the morning (Patient taking differently: Take 1 tablet by mouth in the morning Night time) 90 tablet 1   • [DISCONTINUED] clonazePAM (KlonoPIN) 0 5 mg tablet 0 125 mg Every other night (Patient not taking: Reported on 8/25/2021)     • [DISCONTINUED] sertraline (ZOLOFT) 25 mg tablet ONE TABLET ONCE A DAY FOR FOR 2 WEEKS, THEN 2 TABLETS ONCE A DAY (Patient not taking: No sig reported)       No current facility-administered medications on file prior to visit           Phillip Kessler MD  Movement disorder physician  [address] [place] [address]

## 2023-05-04 ENCOUNTER — OFFICE VISIT (OUTPATIENT)
Dept: NEUROLOGY | Facility: CLINIC | Age: 73
End: 2023-05-04

## 2023-05-04 VITALS — SYSTOLIC BLOOD PRESSURE: 140 MMHG | DIASTOLIC BLOOD PRESSURE: 78 MMHG | WEIGHT: 220 LBS | HEART RATE: 56 BPM

## 2023-05-04 DIAGNOSIS — G20 PARKINSON DISEASE (HCC): Primary | ICD-10-CM

## 2023-05-04 NOTE — PROGRESS NOTES
Review of Systems   Constitutional: Negative  Negative for appetite change and fever  HENT: Negative  Negative for hearing loss, tinnitus, trouble swallowing and voice change  Eyes: Negative  Negative for photophobia, pain and visual disturbance  Respiratory: Negative  Negative for shortness of breath  Cardiovascular: Negative  Negative for palpitations  Gastrointestinal: Negative  Negative for nausea and vomiting  Endocrine: Negative  Negative for cold intolerance  Genitourinary: Negative  Negative for dysuria, frequency and urgency  Musculoskeletal: Negative  Negative for gait problem, myalgias and neck pain  Skin: Negative  Negative for rash  Allergic/Immunologic: Negative  Neurological: Positive for tremors (Thumb of Right hand, has gotten better)  Negative for dizziness, seizures, syncope, facial asymmetry, speech difficulty, weakness, light-headedness, numbness and headaches  Hematological: Negative  Does not bruise/bleed easily  Psychiatric/Behavioral: Negative  Negative for confusion, hallucinations and sleep disturbance  All other systems reviewed and are negative

## 2023-05-04 NOTE — PROGRESS NOTES
Patient ID: Kayla Philip is a 71 y o  male    Assessment/Plan:    Parkinson disease ([facility] )  Tremor predominant PD, H&Y stage I, with slow progression tremors which are not fully controlled on low-dose levodopa along with pramipexole ER  No clear fluctuations  Tremors have become more frequent and he is interested in any medication changes or options to better control tremor as this is now bothersome  Time spent discussing treatment options  We could further increase levodopa to see if tremor respond to higher doses  Given there is no wearing off, and instead he is having more difficulty with tremor itself, will increase to 1 5 tablets 3 times daily  If no improvement in 2 weeks he can further increase to 2 tablets 3 times daily  Would limit any increase in pramipexole given prior side effects weight gain  Questions with regards to MRI focused ultrasound in the treatment of Parkinson disease tremor were answered  CBD/THC in chair has helped with sleep  Encouraged to continue with daily exercise and involvement with PD boxing program         Diagnoses and all orders for this visit:    Parkinson disease ([facility] )    Other orders  -     NON FORMULARY; CBD / THC Tincture (dream) at night        Subjective:      Mr Terra Muse is a 71year-old man who presents for follow up for Parkinson's disease  To review, symptoms began in January 2021 with right arm and leg rest tremor  In retrospect insomnia with subsequent anxiety presents since Aug 2020  Dx by Neurology at [facility] Dr Charlie Mendoza  Started on Zoloft for anxiety  Seen by me for 3rd neurological opinion 5/2021  Opted not to treat until 9/2021  He continues to have tremors which are not fully controlled on low-dose levodopa  He continue to have a right hand rest tremor which is bothersome at times  Speech is unchanged  There is no drooling  No difficulty chewing and swallowing  Dressing and hygiene acts performed independently  Handwriting is unchanged  There is no difficulty arising out of chair  Gait is unchanged  Sleeps well with CBD tincture  There is no daytime sedation  No RBD symptoms  Cognition is unchanged  He reads, does crosswords and play games  No hallucinations, leg edema, lightheadedness, impulsivity, or daytime sedation  No difficulty driving  He exercises daily  He remains very active attending PD Boxing 3 times weekly at [facility]  He walks and lifts weights  Prior medication trials:  Pramipexole Er 0 75mg qhs- compulsive eating and weight gain, lightheadedness, mild HA    Current medications:  Sinemet 25/100 1 tab 3 times daily (6-7, 12pm, 6-7)  pramipexole Er 0 375mg q 9:30pm- weight gain  CBD dream tincture  - helps with sleep          Objective:    /78 (BP Location: Left arm, Patient Position: Standing, Cuff Size: Large)   Pulse 56   Wt 99 8 kg (220 lb)       Physical Exam  Vitals reviewed  Eyes:      Extraocular Movements: Extraocular movements intact  Pupils: Pupils are equal, round, and reactive to light  Neurological:      Mental Status: He is alert  Motor: Motor strength is normal          Neurological Exam  Mental Status  Alert  Oriented to person, place, time and situation  Speech: hypophonia  Language is fluent with no aphasia  Attention and concentration are normal     Cranial Nerves  CN III, IV, VI: Extraocular movements intact bilaterally  Pupils equal round and reactive to light bilaterally  CN VII: Full and symmetric facial movement  CN VIII: Hearing is normal   CN IX, X: Palate elevates symmetrically  CN XI: Shoulder shrug strength is normal   CN XII: Tongue midline without atrophy or fasciculations  Motor   Normal muscle tone  Strength is 5/5 throughout all four extremities  Sensory  Light touch is normal in upper and lower extremities       Coordination  Right: Finger-to-nose normal  Rapid alternating movement abnormality:Left: Finger-to-nose normal  Rapid alternating movement abnormality:  See motor UPDRS  Gait  Casual gait is normal including stance, stride, and arm swing  Retropulsed , recovered in 4 steps    Able to rise from chair without using arms  Normal stride  Reduced right arm swing with rest tremor        MDS UPDRS III                              5/4/23   Time since last dose:    Speech  0   Facial Expression  1   Rigidity - Neck  0   Rigidity - Upper Extremity (R)  0   Rigidity - Upper Extremity (L)   0   Rigidity - Lower Extremity (R)  0   Rigidity - Lower Extremity (L)   0   Finger Taps (R)   0   Finger Taps (L)   0   Hand Movement (R)  1   Hand Movement (L)   0   Pronation/Supination (R)  2   Pronation/Supination (L)   0   Toe Tapping (R) 1   Toe Tapping (L) 1   Leg Agility (R)  0   Leg Agility (L)   0   Arising from Chair   0   Gait   1   Freezing of Gait 0   Postural Stability   1   Posture 1   Global spontaneity of movement 0   Postural Tremor (Ri 0   Postural Tremor (L) 0   Kinetic Tremor (R)  0   Kinetic Tremor (L)  0   Rest tremor amplitude RUE 2   Rest tremor amplitude LUE 0   Rest tremor amplitude RLE 0   Reset tremor amplitude LLE 0   Lip/Jaw Tremor  0   Consistency of tremor 3   Motor Exam Total:             Current Outpatient Medications on File Prior to Visit   Medication Sig Dispense Refill    acetaminophen (TYLENOL) 325 mg tablet Take 325 mg by mouth as needed for mild pain      NON FORMULARY CBD / THC Tincture (dream) at night      Pramipexole Dihydrochloride ER 0 375 MG TB24 Take 1 tablet (0 375 mg total) by mouth daily at bedtime Night time 90 tablet 2    [DISCONTINUED] carbidopa-levodopa (SINEMET)  mg per tablet take 1 TAB 3 TIMES DAILY 270 tablet 1    carbidopa-levodopa (SINEMET)  mg per tablet TAKE 1 5 TABLET BY MOUTH THREE TIMES A  tablet 2    esomeprazole (NexIUM) 40 MG capsule Take 40 mg by mouth daily (Patient not taking: Reported on 5/4/2023)      [DISCONTINUED] clonazePAM (KlonoPIN) 0 5 mg tablet 0 125 mg Every other night (Patient not taking: Reported on 8/25/2021)      [DISCONTINUED] sertraline (ZOLOFT) 25 mg tablet ONE TABLET ONCE A DAY FOR FOR 2 WEEKS, THEN 2 TABLETS ONCE A DAY (Patient not taking: No sig reported)       No current facility-administered medications on file prior to visit           Cony Moses MD  Movement disorder physician  [address] [place] [address]

## 2023-05-04 NOTE — ASSESSMENT & PLAN NOTE
Tremor predominant PD, H&Y stage I, with slow progression tremors which are not fully controlled on low-dose levodopa along with pramipexole ER  No clear fluctuations  Tremors have become more frequent and he is interested in any medication changes or options to better control tremor as this is now bothersome  Time spent discussing treatment options  We could further increase levodopa to see if tremor respond to higher doses  Given there is no wearing off, and instead he is having more difficulty with tremor itself, will increase to 1 5 tablets 3 times daily  If no improvement in 2 weeks he can further increase to 2 tablets 3 times daily  Would limit any increase in pramipexole given prior side effects weight gain  Questions with regards to MRI focused ultrasound in the treatment of Parkinson disease tremor were answered  CBD/THC in chair has helped with sleep        Encouraged to continue with daily exercise and involvement with PD boxing program

## 2023-05-04 NOTE — PATIENT INSTRUCTIONS
Given bothersome tremor will increase carbidopa/levodopa 25/100 to 1 5 tabs 3 times daily  If no improvement after 2 weeks can further increase to 2 tabs 3 times daily  If you develop side effects contact the office

## 2023-07-28 DIAGNOSIS — G20 PARKINSON DISEASE (HCC): ICD-10-CM

## 2023-07-30 RX ORDER — PRAMIPEXOLE 0.38 MG/1
1 TABLET, EXTENDED RELEASE ORAL
Qty: 90 TABLET | Refills: 2 | Status: SHIPPED | OUTPATIENT
Start: 2023-07-30

## 2023-10-05 ENCOUNTER — OFFICE VISIT (OUTPATIENT)
Dept: NEUROLOGY | Facility: CLINIC | Age: 73
End: 2023-10-05
Payer: COMMERCIAL

## 2023-10-05 VITALS — DIASTOLIC BLOOD PRESSURE: 74 MMHG | WEIGHT: 221.8 LBS | SYSTOLIC BLOOD PRESSURE: 154 MMHG | HEART RATE: 55 BPM

## 2023-10-05 DIAGNOSIS — G20.A1 PARKINSON DISEASE: Primary | ICD-10-CM

## 2023-10-05 DIAGNOSIS — K59.01 SLOW TRANSIT CONSTIPATION: ICD-10-CM

## 2023-10-05 PROCEDURE — 99215 OFFICE O/P EST HI 40 MIN: CPT | Performed by: PSYCHIATRY & NEUROLOGY

## 2023-10-05 RX ORDER — CELECOXIB 200 MG/1
200 CAPSULE ORAL AS NEEDED
COMMUNITY

## 2023-10-05 NOTE — PROGRESS NOTES
Review of Systems   Constitutional: Negative for appetite change, fatigue and fever. HENT: Negative. Negative for hearing loss, tinnitus, trouble swallowing and voice change. Eyes: Negative. Negative for photophobia, pain and visual disturbance. Respiratory: Negative. Negative for shortness of breath. Cardiovascular: Negative. Negative for palpitations. Gastrointestinal: Positive for nausea. Negative for vomiting. Endocrine: Negative. Negative for cold intolerance. Genitourinary: Negative. Negative for dysuria, frequency and urgency. Constipation   Musculoskeletal: Negative for back pain, gait problem, myalgias and neck pain. Skin: Negative. Negative for rash. Allergic/Immunologic: Negative. Neurological: Negative. Negative for dizziness, tremors, seizures, syncope, facial asymmetry, speech difficulty, weakness, light-headedness, numbness and headaches. Hematological: Negative. Does not bruise/bleed easily. Psychiatric/Behavioral: Negative. Negative for confusion, hallucinations and sleep disturbance. All other systems reviewed and are negative.

## 2023-10-05 NOTE — PROGRESS NOTES
Patient ID: Robby Allan is a 68 y.o. male    Assessment/Plan:    Slow transit constipation  Worsening constipation. He reports a history of constipation for which he is taking MiraLAX daily for years. This has always been effective and now is not as effective with him having difficulty going over a few days. He was encouraged to increase fluid intake. We discussed dietary changes that could be made. He could try taking Metamucil and a way to increase fiber. He reports being due for colonoscopy. I recommended perhaps having a consultation with the GI physician prior to discuss worsening constipation for any further work-up or suggestions. Parkinson disease ([address])  Tremor predominant PD since 2021, H&Y stage I, with tremors which are not fully controlled on levodopa along with pramipexole. Tremor continues to be his most bothersome symptom  Along with constipation. After much discussion it appears that perhaps his medications are starting to wear off closer to 4 hours. Therefore instructed to take  Sinemet 25/100 1.5 tabs  at 7am , 11am, 3pm and 7pm  Pramipexole ER 0.375mg q 9:30pm     Time spent discussing other treatment options which may better control tremor. We could try adding medication such as amantadine or trihexyphenidyl. I am hesitant to do so at this time given he is currently having significant difficulty with constipation which may be a potential side effect. Therefore levodopa increase was made. We also spoke about neurosurgical options and their use in Parkinson's disease. He is very early into his disease with very little difficulty other than intractable tremor. We discussed the role of MRI focused ultrasound versus DBS in treating patients with Parkinson's disease. At this point I believe the best course of action would be increasing levodopa and trying to address worsening constipation.       Encouraged to continue with physical activity and to continue to repeat the boxing course. Encouraged to increase liquids. Diagnoses and all orders for this visit:    Parkinson disease    Slow transit constipation    Other orders  -     celecoxib (CeleBREX) 200 mg capsule; Take 200 mg by mouth if needed for mild pain    I have spent a total time of 45 minutes on 10/06/23 in caring for this patient including Prognosis, Risks and benefits of tx options, Patient and family education, Impressions, Counseling / Coordination of care and Documenting in the medical record. Subjective:      Mr. Maxine Plascencia is a 68year-old man who presents for follow up for Parkinson's disease. To review, symptoms began in January 2021 with right arm and leg rest tremor. In retrospect insomnia with subsequent anxiety presents since Aug 2020. Dx by Neurology at [facility] Dr. Chris Connelly. Started on Zoloft for anxiety. Seen by me for 3rd neurological opinion 5/2021. Opted not to treat until 9/2021. He continues to have tremors which are not fully controlled on low-dose levodopa. He presents today with nausea and constipation. Increasing constipation developed in Jul;y and has persisted. He has taken daily Miralax for years but no longer effective. He is working on increasing liquids as he knows this is an issue. Drinks about 36 oz total.     Right hand rest tremor present much of the day. Speech is unchanged. No difficulty chewing and swallowing. Dressing and hygiene acts performed independently. Ambulates well without any difficulty. Sleeps well. No RBD symptoms. Cognition is unchanged. No hallucinations, leg edema, lightheadedness, impulsivity, or daytime sedation. No difficulty driving. Attends PD Boxing 3 times weekly at [facility]. He walks and lifts weights.        Prior medication trials:  Pramipexole Er 0.75mg qhs- compulsive eating and weight gain, lightheadedness, mild HA    Current medications:  Sinemet 25/100 1.5 tab 3 times daily (6-7, 12pm, 6-7)  pramipexole Er 0.375mg q 9:30pm- weight gain  CBD dream tincture  - helps with sleep          Objective:    /74 (BP Location: Left arm, Patient Position: Sitting, Cuff Size: Adult)   Pulse 55   Wt 101 kg (221 lb 12.8 oz)       Physical Exam  Vitals reviewed. Eyes:      Extraocular Movements: Extraocular movements intact. Pupils: Pupils are equal, round, and reactive to light. Neurological:      Mental Status: He is alert. Motor: Motor strength is normal.        Neurological Exam  Mental Status  Alert. Oriented to person, place, time and situation. Speech: hypophonia. Language is fluent with no aphasia. Attention and concentration are normal.    Cranial Nerves  CN III, IV, VI: Extraocular movements intact bilaterally. Pupils equal round and reactive to light bilaterally. CN VII: Full and symmetric facial movement. CN VIII: Hearing is normal.  CN IX, X: Palate elevates symmetrically  CN XI: Shoulder shrug strength is normal.  CN XII: Tongue midline without atrophy or fasciculations. Motor   Normal muscle tone. Strength is 5/5 throughout all four extremities. Sensory  Light touch is normal in upper and lower extremities. Coordination  Right: Finger-to-nose normal. Rapid alternating movement abnormality:Left: Finger-to-nose normal. Rapid alternating movement abnormality:  See motor UPDRS. Gait   Able to rise from chair without using arms. Good stride. Reduced arm swing with tremor. .      MDS UPDRS III                              5/4/23 10/5/23   Time since last dose:      Speech  0 0   Facial Expression  1 1   Rigidity - Neck  0 0   Rigidity - Upper Extremity (R)  0 1   Rigidity - Upper Extremity (L)   0 0   Rigidity - Lower Extremity (R)  0 1   Rigidity - Lower Extremity (L)   0 0   Finger Taps (R)   0 1   Finger Taps (L)   0 0   Hand Movement (R)  1 1   Hand Movement (L)   0 0   Pronation/Supination (R)  2 2   Pronation/Supination (L)   0 0   Toe Tapping (R) 1 1   Toe Tapping (L) 1 1   Leg Agility (R)  0 0   Leg Agility (L)   0 0   Arising from Chair   0 0   Gait   1 1   Freezing of Gait 0 0   Postural Stability   1 0   Posture 1 1   Global spontaneity of movement 0 0   Postural Tremor (Ri 0 0   Postural Tremor (L) 0 0   Kinetic Tremor (R)  0 0   Kinetic Tremor (L)  0 0   Rest tremor amplitude RUE 2 2   Rest tremor amplitude LUE 0 0   Rest tremor amplitude RLE 0 0   Reset tremor amplitude LLE 0 0   Lip/Jaw Tremor  0 0   Consistency of tremor 3 3   Motor Exam Total:                Lydia Ruelas MD  Movement disorder physician  [address] [facility]

## 2023-10-05 NOTE — ASSESSMENT & PLAN NOTE
Worsening constipation. He reports a history of constipation for which he is taking MiraLAX daily for years. This has always been effective and now is not as effective with him having difficulty going over a few days. He was encouraged to increase fluid intake. We discussed dietary changes that could be made. He could try taking Metamucil and a way to increase fiber. He reports being due for colonoscopy. I recommended perhaps having a consultation with the GI physician prior to discuss worsening constipation for any further work-up or suggestions.

## 2023-10-05 NOTE — PATIENT INSTRUCTIONS
Sinemet 25/100 1.5 tabs  at 7am , 11am, 3pm and 7pm  Pramipexole ER 0.375mg q 9:30pm     Contact PCP regarding referral to GI

## 2023-10-06 NOTE — ASSESSMENT & PLAN NOTE
Tremor predominant PD since 2021, H&Y stage I, with tremors which are not fully controlled on levodopa along with pramipexole. Tremor continues to be his most bothersome symptom  Along with constipation. After much discussion it appears that perhaps his medications are starting to wear off closer to 4 hours. Therefore instructed to take  Sinemet 25/100 1.5 tabs  at 7am , 11am, 3pm and 7pm  Pramipexole ER 0.375mg q 9:30pm     Time spent discussing other treatment options which may better control tremor. We could try adding medication such as amantadine or trihexyphenidyl. I am hesitant to do so at this time given he is currently having significant difficulty with constipation which may be a potential side effect. Therefore levodopa increase was made. We also spoke about neurosurgical options and their use in Parkinson's disease. He is very early into his disease with very little difficulty other than intractable tremor. We discussed the role of MRI focused ultrasound versus DBS in treating patients with Parkinson's disease. At this point I believe the best course of action would be increasing levodopa and trying to address worsening constipation. Encouraged to continue with physical activity and to continue to repeat the boxing course. Encouraged to increase liquids.

## 2023-12-12 ENCOUNTER — TELEPHONE (OUTPATIENT)
Dept: NEUROLOGY | Facility: CLINIC | Age: 73
End: 2023-12-12

## 2023-12-12 NOTE — TELEPHONE ENCOUNTER
12/11 at 3:06 pm:    Good afternoon. My name is Mary Pritchett. I am a patient of Dr. Fernanda Ferrera and I have a question. I've been very, very constipated lately. I am having a colonoscopy on January 5th and I've been nauseous but very, very constipated. And I'm wondering if Dr. Fernanda Ferrera or someone could get back to me. Does she have any suggestions or recommendations? I am currently on a stool softener and a mild laxative which my GI specialist told me to take, but it doesn't seem to be working as well as it should be. And I'm very constipated.  ______________________________________________________________________________________________________    Ulisses Dwyer pt. No answer. Left a message on voice mail requesting a return call.

## 2023-12-12 NOTE — TELEPHONE ENCOUNTER
Spoke with pt to get more information. Pt had OV with GI (Dr. Weston Fitzpatrick) on 12/5/23. Is scheduled for colonoscopy on 1/5/24. Pt has not had a bowel movement since Saturday. He is taking a [facility] stool softener daily, and Dulcolax. He took Dulcolax yesterday and today. Pt also takes Miralax daily mixed in Gatorade. Also drank prune juice today. Pt reports having constipation issues all his life, but it has worsened since about June, when he changed his dosing of carbidopa-levodopa to 1.5 tabs TID. Pt said that he did try increasing the carbidopa-levodopa to QID, but it really did not help his symptoms, and he was having issues sleeping. He feels he is doing okay on his current dose of 1.5 tabs TID. His main issue is a tremor in his thumb and worsening constipation. Asked pt if his abdomen is distended and hard, and pt said that he feels he now has a little "pot belly". Also gets occasional mid to lower back pain, which he read could be a sign of constipation. Pt requested to ask Dr. Bridgette Kelly what she would advise he do about the constipation.

## 2023-12-13 NOTE — TELEPHONE ENCOUNTER
Ivelisse Albarado MD  Neurology [facility] Clinical Team 42 hours ago (6:10 AM)       He could try taking Senna to see if this would resolve current constipation state.  While continuing to hydrate, increase fiber, etc

## 2024-01-17 DIAGNOSIS — G20.A1 PARKINSON DISEASE: ICD-10-CM

## 2024-03-14 ENCOUNTER — OFFICE VISIT (OUTPATIENT)
Dept: NEUROLOGY | Facility: CLINIC | Age: 74
End: 2024-03-14
Payer: COMMERCIAL

## 2024-03-14 VITALS — WEIGHT: 218 LBS | SYSTOLIC BLOOD PRESSURE: 134 MMHG | HEART RATE: 67 BPM | DIASTOLIC BLOOD PRESSURE: 74 MMHG

## 2024-03-14 DIAGNOSIS — K59.01 SLOW TRANSIT CONSTIPATION: ICD-10-CM

## 2024-03-14 DIAGNOSIS — G20.A1 PARKINSON'S DISEASE WITHOUT DYSKINESIA OR FLUCTUATING MANIFESTATIONS: Primary | ICD-10-CM

## 2024-03-14 PROCEDURE — 99214 OFFICE O/P EST MOD 30 MIN: CPT | Performed by: PSYCHIATRY & NEUROLOGY

## 2024-03-14 RX ORDER — TAMSULOSIN HYDROCHLORIDE 0.4 MG/1
0.4 CAPSULE ORAL DAILY
COMMUNITY
Start: 2024-01-17

## 2024-03-14 NOTE — PROGRESS NOTES
Review of Systems   Constitutional: Negative.  Negative for appetite change, fatigue and fever.   HENT: Negative.  Negative for hearing loss, tinnitus, trouble swallowing and voice change.    Eyes: Negative.  Negative for photophobia, pain and visual disturbance.   Respiratory: Negative.  Negative for shortness of breath.    Cardiovascular: Negative.  Negative for palpitations.   Gastrointestinal: Negative.  Negative for nausea and vomiting.   Endocrine: Negative.  Negative for cold intolerance.   Genitourinary: Negative.  Negative for dysuria, frequency and urgency.   Musculoskeletal: Negative.  Negative for back pain, gait problem, myalgias, neck pain and neck stiffness.   Skin: Negative.  Negative for rash.   Allergic/Immunologic: Negative.    Neurological:  Positive for tremors (Right Thumb, has stayed about the same). Negative for dizziness, seizures, syncope, facial asymmetry, speech difficulty, weakness, light-headedness, numbness and headaches.   Hematological: Negative.  Does not bruise/bleed easily.   Psychiatric/Behavioral: Negative.  Negative for confusion, hallucinations and sleep disturbance.    All other systems reviewed and are negative.

## 2024-03-14 NOTE — ASSESSMENT & PLAN NOTE
Tremor predominant PD since 2021, H&Y stage 2 (with subtle bradykinesia of the left hand).  Right hand tremor has never been fully controlled on levodopa along with pramipexole.  With recent increase there was a dampening of the tremor of this hand.    Overall functioning well.  Will continue on current regimen of carbidopa/levodopa 25/102 tablets 3 times daily along with pramipexole ER 0.375 mg nightly.    Time spent discussing other treatment options which may better control tremor.  We could try adding medication such as amantadine or trihexyphenidyl.  We opted not to start this given potential side effects.      We briefly discussed neurosurgical options and their use in Parkinson's disease.  He is very early into his disease with very little difficulty other than intractable tremor.  We discussed the role of MRI focused ultrasound versus DBS in treating patients with Parkinson's disease.      Encouraged to continue with physical activity and to continue to repeat the boxing course.

## 2024-03-14 NOTE — ASSESSMENT & PLAN NOTE
Has since had his colonoscopy and this was unremarkable.  He is now taking a stool softener daily which has greatly helped.  Also has made dietary changes which may be contributing to improvement.

## 2024-04-04 DIAGNOSIS — G20.A1 PARKINSON DISEASE: ICD-10-CM

## 2024-04-04 NOTE — TELEPHONE ENCOUNTER
Received vm from 4/2 at 11:33am- good morning. My name is Sameer prieto. I am a patient of Dr. Shilpa malave. And I need a new prescription for my carbidopa/levodopa. we went from 1.5 tabs 3 times a day,  I now take 2 tablets, 3 times a day. So I need a prescription for 2 tablets, 3 times a day. My number is [phone]. And I deal with [facility] on [address] in [place] again, sameer prieto, [phone]. Thank you.  ------------------------------------------  Called pt and confirmed vm was received    Script entered, please sign off

## 2024-04-26 DIAGNOSIS — G20.A1 PARKINSON DISEASE: ICD-10-CM

## 2024-04-26 RX ORDER — PRAMIPEXOLE 0.38 MG/1
1 TABLET, EXTENDED RELEASE ORAL
Qty: 90 TABLET | Refills: 2 | Status: SHIPPED | OUTPATIENT
Start: 2024-04-26

## 2024-08-29 ENCOUNTER — OFFICE VISIT (OUTPATIENT)
Dept: NEUROLOGY | Facility: CLINIC | Age: 74
End: 2024-08-29
Payer: MEDICARE

## 2024-08-29 VITALS — WEIGHT: 222.4 LBS | DIASTOLIC BLOOD PRESSURE: 79 MMHG | SYSTOLIC BLOOD PRESSURE: 129 MMHG | HEART RATE: 56 BPM

## 2024-08-29 DIAGNOSIS — G20.A1 PARKINSON'S DISEASE WITHOUT DYSKINESIA OR FLUCTUATING MANIFESTATIONS: Primary | ICD-10-CM

## 2024-08-29 DIAGNOSIS — K59.01 SLOW TRANSIT CONSTIPATION: ICD-10-CM

## 2024-08-29 PROCEDURE — 99214 OFFICE O/P EST MOD 30 MIN: CPT | Performed by: PSYCHIATRY & NEUROLOGY

## 2024-08-29 RX ORDER — FLUTICASONE PROPIONATE 50 MCG
1 SPRAY, SUSPENSION (ML) NASAL DAILY
COMMUNITY

## 2024-08-29 RX ORDER — AMANTADINE HYDROCHLORIDE 100 MG/1
CAPSULE, GELATIN COATED ORAL
Qty: 60 CAPSULE | Refills: 5 | Status: SHIPPED | OUTPATIENT
Start: 2024-08-29

## 2024-08-29 NOTE — PROGRESS NOTES
Review of Systems   Constitutional: Negative.  Negative for appetite change, fatigue and fever.   HENT: Negative.  Negative for hearing loss, tinnitus, trouble swallowing and voice change.    Eyes: Negative.  Negative for photophobia, pain and visual disturbance.   Respiratory: Negative.  Negative for shortness of breath.    Cardiovascular: Negative.  Negative for palpitations.   Gastrointestinal: Negative.  Negative for nausea and vomiting.   Endocrine: Negative.  Negative for cold intolerance.   Genitourinary: Negative.  Negative for dysuria, frequency and urgency.   Musculoskeletal:  Negative for back pain, gait problem, myalgias, neck pain and neck stiffness.   Skin: Negative.  Negative for rash.   Allergic/Immunologic: Negative.    Neurological:  Positive for tremors (Thumb of Right Hand, has stayed the same, once in a while when laying down he has a Tremor in the Big Toe of Right Foot). Negative for dizziness, seizures, syncope, facial asymmetry, speech difficulty, weakness, light-headedness, numbness and headaches.   Hematological: Negative.  Does not bruise/bleed easily.   Psychiatric/Behavioral:  Negative for confusion, hallucinations and sleep disturbance.    All other systems reviewed and are negative.

## 2024-08-29 NOTE — PROGRESS NOTES
Patient ID: Sameer Hanna is a 73 y.o. male    Assessment/Plan:    Parkinson disease (HCC)  Tremor predominant PD since 2021, H&Y stage 2 (with subtle bradykinesia of the left hand).  Right hand tremor has never been fully controlled on levodopa along with pramipexole.      Will try adding amantadine 100mg qam. After a week if tolerated add a second dose at 1pm  Potential side effects include leg edema, hallucinations, lightheadedness, or rash.    If ineffective or you develop side effects we will taper off.       Time spent discussing neurosurgical options for PD including deep brain stimulation (STN ) and High focused ultrasound surgery (unilateral treatment of right hand tremor).  Potential risk and benefits were reviewed.  Will try adjustments of medications first.    Has had slow progression over the past 3 years.  He is encouraged to continue with physical activity and exercise.      Diagnoses and all orders for this visit:    Parkinson's disease without dyskinesia or fluctuating manifestations  -     amantadine (SYMMETREL) 100 mg capsule; 1 po qam x 1 week then 1 po twice daily    Slow transit constipation    Other orders  -     fluticasone (FLONASE) 50 mcg/act nasal spray; 1 spray into each nostril daily  -     SENNA PO; Take by mouth if needed  -     Docusate Calcium (STOOL SOFTENER PO); Take by mouth in the morning        Subjective:      Mr. Hanna is a 73 year-old man who presents for follow up for Parkinson's disease. To review, symptoms onset January 2021 with right arm and leg rest tremor. In retrospect insomnia with subsequent anxiety presents since Aug 2020. Dx by Neurology at [facility] Dr. Friedman.  Started on Zoloft for anxiety. Seen by me for 3rd neurological opinion 5/2021. Opted not to treat until 9/2021. He continues to have tremors which are not fully controlled on low-dose levodopa.    Right hand resrt tremor okay okay other than the tremor anything else is bothering you are  you are having any difficulty using your hands to do finer things opening jars buttoning buttons getting dressed brushing teeth nothing like no trouble with walking or changes in a walking  Attends [employer]ing 3 times weekly at [place]. Lifts and walks.  Sleeps well.    No hallucinations, leg edema, lightheadedness, impulsivity, or daytime sedation. No difficulty driving.     Prior medication trials:  Pramipexole ER 0.75mg qhs- compulsive eating and weight gain, lightheadedness, mild HA    Current medications:  Sinemet 25/100 2 tabs  at 7am , 12-1pm, 5-6pm   Pramipexole ER 0.375mg q 9:30pm   CBD dream tincture  - helps with sleep at 10:15pm              Objective:    /79 (BP Location: Right arm, Patient Position: Standing, Cuff Size: Large)   Pulse 56   Wt 101 kg (222 lb 6.4 oz)       Physical Exam  Vitals reviewed.   Eyes:      Extraocular Movements: Extraocular movements intact.      Pupils: Pupils are equal, round, and reactive to light.   Neurological:      Mental Status: He is alert.      Motor: Motor strength is normal.  Psychiatric:         Speech: Speech normal.         Neurological Exam  Mental Status  Alert. Oriented to person, place, time and situation. Speech is normal. Language is fluent with no aphasia. Attention and concentration are normal.    Cranial Nerves  CN III, IV, VI: Extraocular movements intact bilaterally. Pupils equal round and reactive to light bilaterally.  CN VII: Full and symmetric facial movement.  CN VIII: Hearing is normal.  CN IX, X: Palate elevates symmetrically  CN XI: Shoulder shrug strength is normal.  CN XII: Tongue midline without atrophy or fasciculations.    Motor   Normal muscle tone. Strength is 5/5 throughout all four extremities.    Coordination  Right: Finger-to-nose normal.Left: Finger-to-nose normal.    Intermittent moderate amplitude right hand rest tremor.    See motor UPDRS.    Gait   Able to rise from chair without using arms.  Reduced arm strength on the  right with rest tremor.  Good stride.  No freezing of festination..    MDS UPDRS III                              8/29/24 3/14/24 10/5/23   Time since last dose:        Speech  0 0 0   Facial Expression  1 1 1   Rigidity - Neck  0   0   Rigidity - Upper Extremity (R)  1 0 1   Rigidity - Upper Extremity (L)   0 0 0   Rigidity - Lower Extremity (R)  0 0 1   Rigidity - Lower Extremity (L)   0 0 0   Finger Taps (R)   1 0 1   Finger Taps (L)   0 0 0   Hand Movement (R)  0 0 1   Hand Movement (L)   0 0 0   Pronation/Supination (R)  1 2 2   Pronation/Supination (L)   0 0 0   Toe Tapping (R) 1 1 1   Toe Tapping (L) 1 1 1   Leg Agility (R)  0 0 0   Leg Agility (L)   0 0 0   Arising from Chair   0 0 0   Gait   1 1 1   Freezing of Gait 0 0 0   Postural Stability   0 0 0   Posture 1 1 1   Global spontaneity of movement 0 0 0   Postural Tremor (Ri 0 0 0   Postural Tremor (L) 0 0 0   Kinetic Tremor (R)  1 1 0   Kinetic Tremor (L)  1 1 0   Rest tremor amplitude RUE 2 2 2   Rest tremor amplitude LUE 0 0 0   Rest tremor amplitude RLE 0 0 0   Reset tremor amplitude LLE 0 0 0   Lip/Jaw Tremor  0 0 0   Consistency of tremor 2 2 3   Motor Exam Total:                          Shilpa Oreilly MD  Movement disorder physician  [facility]

## 2024-08-29 NOTE — PATIENT INSTRUCTIONS
PD with intractable right hand tremor.    Will try adding amantadine 100mg qam. After a week if tolerated add a second dose at 1pm  Potential side effects include leg edema, hallucinations, lightheadedness, or rash.    If ineffective or you develop side effects we will taper off.       Time spent discussing neurosurgical options for PD including deep brains stimulation and High focused ultrasound surgery.

## 2024-08-29 NOTE — ASSESSMENT & PLAN NOTE
Tremor predominant PD since 2021, H&Y stage 2 (with subtle bradykinesia of the left hand).  Right hand tremor has never been fully controlled on levodopa along with pramipexole.      Will try adding amantadine 100mg qam. After a week if tolerated add a second dose at 1pm  Potential side effects include leg edema, hallucinations, lightheadedness, or rash.    If ineffective or you develop side effects we will taper off.       Time spent discussing neurosurgical options for PD including deep brain stimulation (STN ) and High focused ultrasound surgery (unilateral treatment of right hand tremor).  Potential risk and benefits were reviewed.  Will try adjustments of medications first.    Has had slow progression over the past 3 years.  He is encouraged to continue with physical activity and exercise.

## 2024-09-09 ENCOUNTER — TELEPHONE (OUTPATIENT)
Age: 74
End: 2024-09-09

## 2024-09-09 NOTE — TELEPHONE ENCOUNTER
pt called and and states that amantadine was added at recent appt and he took it once a day for a week  and he is ready to go to 2 times a day. He is asking  when should he take the second dose.  He takes the first dose at 7am.  He is tolerating med and it is helping a little    Per office note-Will try adding amantadine 100mg qam. After a week if tolerated add a second dose at 1pm .    Reviewed above with him and advised I would let dr malave know that he will be increasing to bid    I     [phone] to leave detailed message

## 2024-10-23 DIAGNOSIS — G20.A1 PARKINSON'S DISEASE WITHOUT DYSKINESIA OR FLUCTUATING MANIFESTATIONS (HCC): ICD-10-CM

## 2024-10-23 RX ORDER — AMANTADINE HYDROCHLORIDE 100 MG/1
CAPSULE, GELATIN COATED ORAL
Qty: 180 CAPSULE | Refills: 1 | Status: SHIPPED | OUTPATIENT
Start: 2024-10-23

## 2024-10-24 ENCOUNTER — TELEPHONE (OUTPATIENT)
Age: 74
End: 2024-10-24

## 2024-10-24 NOTE — TELEPHONE ENCOUNTER
Pt has had gout 5 times since May of last year, he said he read that carbidopa levidopa does increase the risk of gout as well as kidney issues . He'd like to know if Dr Jo Bird had any information on this or any recommendations.

## 2024-10-29 NOTE — TELEPHONE ENCOUNTER
Spoke with pt and made him aware of the provider's response. He will decrease the carbidopa-levodopa to 1.5 tabs 4 times a day. Pt reports that he is taking amantadine 100 mg BID, and it has definitely helped with the tremor.

## 2024-10-29 NOTE — TELEPHONE ENCOUNTER
Shilpa Oreilly MD  Neurology Neurovascular Team 411 minutes ago (9:28 AM)       Although not a common side effect there have been reports of the potential of levodopa increasing Gout due to increasing urin acid. I have never encountered it until. We could try reducing levodopa to 1.5 tabs 4 times daily. . Are you still on the amantadine and has this helped with tremor?

## 2024-11-21 ENCOUNTER — TELEPHONE (OUTPATIENT)
Age: 74
End: 2024-11-21

## 2024-11-21 NOTE — TELEPHONE ENCOUNTER
Pt stated that he started having stiffness in his neck about two months ago. It seems to be lessening up a bit, but has some soreness when he turns his head from side to side. Pt denies pain. The stiffness is not restricting his activity. He still goes to boxing, lifts weights, and walks daily. He questioned if he might have strained it doing an exercise activity. The soreness/stiffness is isolated to his neck and does not travel down to his back or shoulders. Meds include amantadine 100 mg 1 cap BID, pramipexole 0.375 mg at bedtime, and carbidopa-levodopa  mg 1.5 tabs TID. Pt reports that he does a lot of sitting and reading in the afternoon and is also questioning if this is contributing to the neck stiffness. Routed to provider to advise if there are any recommendations for pt.

## 2024-11-21 NOTE — TELEPHONE ENCOUNTER
Spoke with pt and made him aware of Dr. Bird's response. Pt stated that he is going to contact his chiropractor for treatment.

## 2024-11-21 NOTE — TELEPHONE ENCOUNTER
Shilpa Oreilly MD  Neurology Neurovascular Team 448 minutes ago (2:53 PM)       If it is isolated to the neck, not fluctuating with medications could be strain.  Would recommend physical therapy to work with him on improving cervicalgia.  Referral place in case he wishes to proceed.

## 2024-11-21 NOTE — TELEPHONE ENCOUNTER
Pt called in stating he as been experiencing stiffness in back of neck- doesnt know if its strain from activity or from parkinsons      He just asked me to relay message and for recommendations  Please advise.  Thank you

## 2025-02-13 ENCOUNTER — PROCEDURE VISIT (OUTPATIENT)
Dept: NEUROLOGY | Facility: CLINIC | Age: 75
End: 2025-02-13
Payer: COMMERCIAL

## 2025-02-13 VITALS — HEART RATE: 64 BPM | SYSTOLIC BLOOD PRESSURE: 136 MMHG | WEIGHT: 226.4 LBS | DIASTOLIC BLOOD PRESSURE: 82 MMHG

## 2025-02-13 DIAGNOSIS — G20.A1 PARKINSON DISEASE (HCC): ICD-10-CM

## 2025-02-13 DIAGNOSIS — G20.A1 PARKINSON'S DISEASE WITHOUT DYSKINESIA OR FLUCTUATING MANIFESTATIONS (HCC): Primary | ICD-10-CM

## 2025-02-13 DIAGNOSIS — K59.01 SLOW TRANSIT CONSTIPATION: ICD-10-CM

## 2025-02-13 PROCEDURE — 99213 OFFICE O/P EST LOW 20 MIN: CPT | Performed by: PSYCHIATRY & NEUROLOGY

## 2025-02-13 PROCEDURE — G2211 COMPLEX E/M VISIT ADD ON: HCPCS | Performed by: PSYCHIATRY & NEUROLOGY

## 2025-02-13 RX ORDER — CARBIDOPA AND LEVODOPA 25; 100 MG/1; MG/1
TABLET ORAL
Qty: 540 TABLET | Refills: 3 | Status: SHIPPED | OUTPATIENT
Start: 2025-02-13

## 2025-02-13 NOTE — PROGRESS NOTES
Name: Sameer Hanna      : 1950      MRN: [id number]  Encounter Provider: Shilpa Oreilly MD  Encounter Date: 2025   Encounter department: [facility] [place]  :  Assessment & Plan  Parkinson's disease without dyskinesia or fluctuating manifestations (HCC)  Parkinsonian symptoms continue be fairly well-controlled other than intractable right arm rest tremor.  No clear progression on exam.  No motor fluctuations.  He has not developed any dyskinesia.    Again spoke about potential treatment options to better control tremors.  The addition of amantadine may have been somewhat helpful initially.  Therefore we will continue.      Spoke about potential benefits and side effects of additional medications for tremor such as trihexyphenidyl in addition to the role of Deep brain stimulation surgery in better controlling intractable tremor.    Our discussion we opted to keep his current medication regimen.  He will contact the office prior to follow-up should symptoms worsen.  Encouraged to continue with PD boxing and fitness program.       Slow transit constipation  Chronic issue for which he will utilize Senokot and stool softeners as needed.  Discussed dietary changes which could be made to improve constipation.  Courage to increase hydration.       Parkinson disease (HCC)    Orders:    carbidopa-levodopa (SINEMET)  mg per tablet; TAKE 1.5 TABLETS BY MOUTH 3 TIMES DAILY          History of Present Illness   Mr. Hanna is a 73 year-old man who presents for follow up for Parkinson's disease. To review, symptoms onset 2021 with right arm and leg rest tremor. In retrospect insomnia with subsequent anxiety presents since Aug 2020. Dx by Neurology at [facility] Dr. Friedman.  Started on Zoloft for anxiety. Seen by me for 3rd neurological opinion 2021. Opted not to treat until 2021. He continues to have tremors which are not fully controlled on low-dose  levodopa.    He has periods of neck soreness. He does think it is related to posture as he is on his phone a lot.    Right hand rest tremor is unchanged.  He squeezes a ball. It does not affects ADLs.  Sleeps is fragmented, 6-7 hours total. He has mild daytime sedation and will nap in the afternoon.    No hallucinations, leg edema, lightheadedness, impulsivity, or daytime sedation. No difficulty driving.   Attends [employer]ing 3 times weekly at [place]. Lifts, walks, uses elliptical.    Constipation is still present. Will use Senna and stool softner. He understands it is in part due to his diet.      Prior medication trials:  Pramipexole ER 0.75mg qhs- compulsive eating and weight gain, lightheadedness, mild HA    Current medications:  Sinemet 25/100 1.5 tabs  at 7am , 12pm, 6pm   Pramipexole ER 0.375mg q 9:30pm   Amantadine 100mg qam and 12pm    CBD dream tincture  - helps with sleep at 10:15pm            Review of Systems I have personally reviewed the MA's review of systems and made changes as necessary.         Objective   /82 (BP Location: Left arm, Patient Position: Standing, Cuff Size: Large)   Pulse 64   Wt 103 kg (226 lb 6.4 oz)     Physical Exam  Vitals reviewed.   Eyes:      Extraocular Movements: Extraocular movements intact.   Neurological:      Mental Status: He is alert.      Motor: Motor strength is normal.      Neurological Exam  Mental Status  Alert. Oriented to person, place, time and situation. Speech: hypophonia. Language is fluent with no aphasia. Attention and concentration are normal.    Cranial Nerves  CN III, IV, VI: Extraocular movements intact bilaterally.  CN VII: Full and symmetric facial movement.  CN VIII: Hearing is normal.  CN IX, X: Palate elevates symmetrically  CN XI: Shoulder shrug strength is normal.  CN XII: Tongue midline without atrophy or fasciculations.    Motor   Increased muscle tone. Mild intermittent right upper extremity. Strength is 5/5 throughout all four  extremities.    Sensory  Light touch is normal in upper and lower extremities.     Coordination    See motor UPDRS    Slight decrement on right upper extremity taps, rapid altering movements, bilateral toe taps    Intermittent, moderate amplitude rest tremor present in the right hand..    Gait   Able to rise from chair without using arms.  Reduced arm swing.  Good stride.  No freezing festination..       MDS UPDRS III                                       Time since last dose:    Speech  1   Facial Expression  1   Rigidity - Neck     Rigidity - Upper Extremity (R)  1   Rigidity - Upper Extremity (L)   0   Rigidity - Lower Extremity (R)  0   Rigidity - Lower Extremity (L)   0   Finger Taps (R)   1   Finger Taps (L)   0   Hand Movement (R)  0   Hand Movement (L)   0   Pronation/Supination (R)  2   Pronation/Supination (L)   0   Toe Tapping (R) 1   Toe Tapping (L) 1   Leg Agility (R)  0   Leg Agility (L)   0   Arising from Chair    0   Gait   1   Freezing of Gait 0   Postural Stability   0   Posture 1   Global spontaneity of movement 0   Postural Tremor (Ri 0   Postural Tremor (L) 0   Kinetic Tremor (R)  1   Kinetic Tremor (L)  2   Rest tremor amplitude RUE 2   Rest tremor amplitude LUE 0   Rest tremor amplitude RLE 0   Reset tremor amplitude LLE 0   Lip/Jaw Tremor  0   Consistency of tremor 2   Motor Exam Total:

## 2025-02-13 NOTE — ASSESSMENT & PLAN NOTE
Orders:    carbidopa-levodopa (SINEMET)  mg per tablet; TAKE 1.5 TABLETS BY MOUTH 3 TIMES DAILY

## 2025-02-13 NOTE — ASSESSMENT & PLAN NOTE
Parkinsonian symptoms continue be fairly well-controlled other than intractable right arm rest tremor.  No clear progression on exam.  No motor fluctuations.  He has not developed any dyskinesia.    Again spoke about potential treatment options to better control tremors.  The addition of amantadine may have been somewhat helpful initially.  Therefore we will continue.      Spoke about potential benefits and side effects of additional medications for tremor such as trihexyphenidyl in addition to the role of Deep brain stimulation surgery in better controlling intractable tremor.    Our discussion we opted to keep his current medication regimen.  He will contact the office prior to follow-up should symptoms worsen.  Encouraged to continue with PD boxing and fitness program.

## 2025-02-13 NOTE — PROGRESS NOTES
Review of Systems   Constitutional: Negative.  Negative for appetite change, fatigue and fever.   HENT: Negative.  Negative for hearing loss, tinnitus, trouble swallowing and voice change.    Eyes: Negative.  Negative for photophobia, pain and visual disturbance.   Respiratory: Negative.  Negative for shortness of breath.    Cardiovascular: Negative.  Negative for palpitations.   Gastrointestinal: Negative.  Negative for nausea and vomiting.   Endocrine: Negative.  Negative for cold intolerance.   Genitourinary: Negative.  Negative for dysuria, frequency and urgency.   Musculoskeletal:  Positive for neck stiffness (Back of Neck). Negative for back pain, gait problem, myalgias and neck pain.        Body stiffness     Skin: Negative.  Negative for rash.   Allergic/Immunologic: Negative.    Neurological:  Positive for tremors (Right Thumb, has stayed the same). Negative for dizziness, seizures, syncope, facial asymmetry, speech difficulty, weakness, light-headedness, numbness and headaches.   Hematological: Negative.  Does not bruise/bleed easily.   Psychiatric/Behavioral:  Positive for sleep disturbance (Trouble staying asleep). Negative for confusion and hallucinations.    All other systems reviewed and are negative.

## 2025-02-13 NOTE — ASSESSMENT & PLAN NOTE
Chronic issue for which he will utilize Senokot and stool softeners as needed.  Discussed dietary changes which could be made to improve constipation.  Courage to increase hydration.

## 2025-02-17 DIAGNOSIS — G20.A1 PARKINSON DISEASE (HCC): ICD-10-CM

## 2025-02-18 RX ORDER — PRAMIPEXOLE 0.38 MG/1
1 TABLET, EXTENDED RELEASE ORAL
Qty: 90 TABLET | Refills: 2 | Status: SHIPPED | OUTPATIENT
Start: 2025-02-18

## 2025-04-11 DIAGNOSIS — G20.A1 PARKINSON'S DISEASE WITHOUT DYSKINESIA OR FLUCTUATING MANIFESTATIONS (HCC): ICD-10-CM

## 2025-04-11 RX ORDER — AMANTADINE HYDROCHLORIDE 100 MG/1
CAPSULE, GELATIN COATED ORAL
Qty: 180 CAPSULE | Refills: 1 | Status: SHIPPED | OUTPATIENT
Start: 2025-04-11

## 2025-05-29 DIAGNOSIS — G20.A1 PARKINSON DISEASE (HCC): ICD-10-CM

## 2025-05-30 RX ORDER — CARBIDOPA AND LEVODOPA 25; 100 MG/1; MG/1
2 TABLET ORAL 3 TIMES DAILY
Qty: 540 TABLET | Refills: 3 | OUTPATIENT
Start: 2025-05-30

## 2025-05-30 NOTE — TELEPHONE ENCOUNTER
Patient calling because his bottle states no refills and he is concerned. Explained to patient that there was a new script sent to pharmacy in February and the bottle he currently has may have been the end of the previous script. Patient is requesting office reach out to pharmacy to confirm if this is the case

## 2025-08-07 ENCOUNTER — PROCEDURE VISIT (OUTPATIENT)
Dept: NEUROLOGY | Facility: CLINIC | Age: 75
End: 2025-08-07
Payer: COMMERCIAL

## 2025-08-07 VITALS
SYSTOLIC BLOOD PRESSURE: 136 MMHG | WEIGHT: 222.2 LBS | HEART RATE: 59 BPM | DIASTOLIC BLOOD PRESSURE: 80 MMHG | OXYGEN SATURATION: 98 %

## 2025-08-07 DIAGNOSIS — G20.A1 PARKINSON'S DISEASE WITHOUT DYSKINESIA OR FLUCTUATING MANIFESTATIONS (HCC): Primary | ICD-10-CM

## 2025-08-07 DIAGNOSIS — K59.01 SLOW TRANSIT CONSTIPATION: ICD-10-CM

## 2025-08-07 DIAGNOSIS — M54.2 CERVICALGIA: ICD-10-CM

## 2025-08-07 PROCEDURE — 99215 OFFICE O/P EST HI 40 MIN: CPT | Performed by: PSYCHIATRY & NEUROLOGY

## 2025-08-07 RX ORDER — ROSUVASTATIN CALCIUM 10 MG/1
1 TABLET, COATED ORAL DAILY
COMMUNITY
Start: 2025-07-21